# Patient Record
Sex: MALE | Race: WHITE | URBAN - METROPOLITAN AREA
[De-identification: names, ages, dates, MRNs, and addresses within clinical notes are randomized per-mention and may not be internally consistent; named-entity substitution may affect disease eponyms.]

---

## 2020-03-13 ENCOUNTER — OFFICE VISIT (OUTPATIENT)
Dept: INTERNAL MEDICINE CLINIC | Age: 30
End: 2020-03-13

## 2020-03-13 VITALS
BODY MASS INDEX: 28.79 KG/M2 | SYSTOLIC BLOOD PRESSURE: 108 MMHG | OXYGEN SATURATION: 97 % | HEART RATE: 64 BPM | DIASTOLIC BLOOD PRESSURE: 69 MMHG | WEIGHT: 212.6 LBS | HEIGHT: 72 IN | RESPIRATION RATE: 17 BRPM | TEMPERATURE: 98.1 F

## 2020-03-13 DIAGNOSIS — Z13.220 SCREENING FOR LIPID DISORDERS: ICD-10-CM

## 2020-03-13 DIAGNOSIS — Z00.00 WELL ADULT EXAM: Primary | ICD-10-CM

## 2020-03-13 DIAGNOSIS — Z13.1 SCREENING FOR DIABETES MELLITUS: ICD-10-CM

## 2020-03-13 NOTE — PROGRESS NOTES
JULIAN Jones is a 27 y.o. male, he presents today for:    27year old man presents for well visit. Last well visit was likely more than 5 years ago. Medial history significant for kidney stone. No known trigger. Took medication and it passed. Alcohol 1-2 serving 1-2 times per week. 1year old girl. Works as  for for [employer]. Doing this for 4 years. Not exercising very often. Notes he has a long commute. PMH/PSH: reviewed and updated  Sochx:  reports that he has never smoked. He has never used smokeless tobacco. He reports current alcohol use. He reports that he does not use drugs. Famhx: reviewed and updated     All: No Known Allergies  Med: none     Review of Systems   Constitutional: Negative for chills, fever, malaise/fatigue and weight loss. HENT: Negative for congestion. Respiratory: Negative for cough, shortness of breath and wheezing. Cardiovascular: Negative for chest pain and leg swelling. Gastrointestinal: Negative for abdominal pain, diarrhea, nausea and vomiting. Genitourinary: Negative for dysuria. Skin: Negative for rash. Neurological: Negative for dizziness and headaches. PE:  Blood pressure 108/69, pulse 64, temperature 98.1 °F (36.7 °C), temperature source Oral, resp. rate 17, height 6' (1.829 m), weight 212 lb 9.6 oz (96.4 kg), SpO2 97 %. Body mass index is 28.83 kg/m². Physical Exam  Vitals signs and nursing note reviewed. Constitutional:       General: He is not in acute distress. Appearance: Normal appearance. He is well-developed. HENT:      Head: Normocephalic and atraumatic. Nose: Nose normal.      Mouth/Throat:      Mouth: Mucous membranes are moist.   Eyes:      Extraocular Movements: Extraocular movements intact. Conjunctiva/sclera: Conjunctivae normal.      Pupils: Pupils are equal, round, and reactive to light. Neck:      Musculoskeletal: Normal range of motion and neck supple.    Cardiovascular:      Rate and Rhythm: Normal rate and regular rhythm. Pulses: Normal pulses. Heart sounds: Normal heart sounds. Pulmonary:      Effort: Pulmonary effort is normal.      Breath sounds: Normal breath sounds. Musculoskeletal: Normal range of motion. Skin:     General: Skin is warm. Neurological:      General: No focal deficit present. Mental Status: He is alert and oriented to person, place, and time. Labs:   No results found for any visits on 03/13/20. A/P:  27 y.o. male    ICD-10-CM ICD-9-CM    1. Well adult exam Z00.00 V70.0 REFERRAL TO UROLOGY   2. Hyperlipidemia, unspecified hyperlipidemia type E78.5 272.4    3. Screening for diabetes mellitus Z13.1 V77.1 GLUCOSE, RANDOM   4. Screening for lipid disorders Z13.220 V77.91 LIPID PANEL     Well man exam: history and exam revealed issues as noted below. Vaccine status: up to date  Cardiovascular risk: BP well controlled, lipid screen not done recently ordered today as future lab since not fasting. Weight/Diet and Exercise: notes sedentary lifestyle. discussed value of putting focus on improving this. Also discussed that BMi is a little elevated, encouarged patient to consider healthy habits with calories. STD screen: not indicated     - He was given AVS and expressed understanding with the diagnosis and plan as discussed.     Future Appointments   Date Time Provider Janette Biswas   5/21/2020  8:30 AM Harjit Mace MD [id number]

## 2020-03-13 NOTE — PROGRESS NOTES
RM 2    Chief Complaint   Patient presents with   [facility]     new patient      1. Have you been to the ER, urgent care clinic since your last visit? Hospitalized since your last visit? No    2. Have you seen or consulted any other health care providers outside of the [address] [place] [address] since your last visit? Include any pap smears or colon screening. [facility]  Past pcp - 2014 last time he was there     Health Maintenance Due   Topic Date Due    DTaP/Tdap/Td series (2 - Tdap) 01/23/2011       Learning Assessment 3/13/2020   PRIMARY LEARNER Patient   HIGHEST LEVEL OF EDUCATION - PRIMARY LEARNER  4 YEARS OF COLLEGE   BARRIERS PRIMARY LEARNER NONE   CO-LEARNER CAREGIVER No   PRIMARY LANGUAGE ENGLISH    NEED No   LEARNER PREFERENCE PRIMARY VIDEOS   LEARNING SPECIAL TOPICS no   ANSWERED BY patient   RELATIONSHIP SELF       AVS  education, follow up, and recommendations provided and addressed with patient.  services used to advise patient no .

## 2020-03-13 NOTE — PATIENT INSTRUCTIONS
Well Visit, Ages 25 to 48: Care Instructions Your Care Instructions Physical exams can help you stay healthy. Your doctor has checked your overall health and may have suggested ways to take good care of yourself. He or she also may have recommended tests. At home, you can help prevent illness with healthy eating, regular exercise, and other steps. Follow-up care is a key part of your treatment and safety. Be sure to make and go to all appointments, and call your doctor if you are having problems. It's also a good idea to know your test results and keep a list of the medicines you take. How can you care for yourself at home? · Reach and stay at a healthy weight. This will lower your risk for many problems, such as obesity, diabetes, heart disease, and high blood pressure. · Get at least 30 minutes of physical activity on most days of the week. Walking is a good choice. You also may want to do other activities, such as running, swimming, cycling, or playing tennis or team sports. Discuss any changes in your exercise program with your doctor. · Do not smoke or allow others to smoke around you. If you need help quitting, talk to your doctor about stop-smoking programs and medicines. These can increase your chances of quitting for good. · Talk to your doctor about whether you have any risk factors for sexually transmitted infections (STIs). Having one sex partner (who does not have STIs and does not have sex with anyone else) is a good way to avoid these infections. · Use birth control if you do not want to have children at this time. Talk with your doctor about the choices available and what might be best for you. · Protect your skin from too much sun. When you're outdoors from 10 a.m. to 4 p.m., stay in the shade or cover up with clothing and a hat with a wide brim. Wear sunglasses that block UV rays. Even when it's cloudy, put broad-spectrum sunscreen (SPF 30 or higher) on any exposed skin. · See a dentist one or two times a year for checkups and to have your teeth cleaned. · Wear a seat belt in the car. Follow your doctor's advice about when to have certain tests. These tests can spot problems early. For everyone · Cholesterol. Have the fat (cholesterol) in your blood tested after age 21. Your doctor will tell you how often to have this done based on your age, family history, or other things that can increase your risk for heart disease. · Blood pressure. Have your blood pressure checked during a routine doctor visit. Your doctor will tell you how often to check your blood pressure based on your age, your blood pressure results, and other factors. · Vision. Talk with your doctor about how often to have a glaucoma test. 
· Diabetes. Ask your doctor whether you should have tests for diabetes. · Colon cancer. Your risk for colorectal cancer gets higher as you get older. Some experts say that adults should start regular screening at age 48 and stop at age 76. Others say to start before age 48 or continue after age 76. Talk with your doctor about your risk and when to start and stop screening. For women · Breast exam and mammogram. Talk to your doctor about when you should have a clinical breast exam and a mammogram. Medical experts differ on whether and how often women under 50 should have these tests. Your doctor can help you decide what is right for you. · Cervical cancer screening test and pelvic exam. Begin with a Pap test at age 24. The test often is part of a pelvic exam. Starting at age 27, you may choose to have a Pap test, an HPV test, or both tests at the same time (called co-testing). Talk with your doctor about how often to have testing. · Tests for sexually transmitted infections (STIs). Ask whether you should have tests for STIs. You may be at risk if you have sex with more than one person, especially if your partners do not wear condoms. For men · Tests for sexually transmitted infections (STIs). Ask whether you should have tests for STIs. You may be at risk if you have sex with more than one person, especially if you do not wear a condom. · Testicular cancer exam. Ask your doctor whether you should check your testicles regularly. · Prostate exam. Talk to your doctor about whether you should have a blood test (called a PSA test) for prostate cancer. Experts differ on whether and when men should have this test. Some experts suggest it if you are older than 39 and are -American or have a father or brother who got prostate cancer when he was younger than 72. When should you call for help? Watch closely for changes in your health, and be sure to contact your doctor if you have any problems or symptoms that concern you. Where can you learn more? Go to http://gordy-erin.info/ Enter P072 in the search box to learn more about \"Well Visit, Ages 25 to 48: Care Instructions. \" Current as of: August 21, 2019Content Version: 12.4 © 2902-7807 [employer]. Care instructions adapted under license by [employer] (which disclaims liability or warranty for this information). If you have questions about a medical condition or this instruction, always ask your healthcare professional. [address] any warranty or liability for your use of this information.

## 2020-08-18 ENCOUNTER — OFFICE VISIT (OUTPATIENT)
Dept: INTERNAL MEDICINE CLINIC | Age: 30
End: 2020-08-18
Payer: COMMERCIAL

## 2020-08-18 VITALS
WEIGHT: 211 LBS | BODY MASS INDEX: 28.58 KG/M2 | OXYGEN SATURATION: 98 % | SYSTOLIC BLOOD PRESSURE: 134 MMHG | HEIGHT: 72 IN | TEMPERATURE: 97.9 F | RESPIRATION RATE: 17 BRPM | DIASTOLIC BLOOD PRESSURE: 84 MMHG | HEART RATE: 77 BPM

## 2020-08-18 DIAGNOSIS — E78.5 HYPERLIPIDEMIA, UNSPECIFIED HYPERLIPIDEMIA TYPE: ICD-10-CM

## 2020-08-18 DIAGNOSIS — Z00.00 WELL ADULT EXAM: Primary | ICD-10-CM

## 2020-08-18 PROCEDURE — 99395 PREV VISIT EST AGE 18-39: CPT | Performed by: INTERNAL MEDICINE

## 2020-08-18 NOTE — PROGRESS NOTES
JULIAN Ham is a 27 y.o. male, he presents today for:     Family is doing well. Not yet having success with completing     Getting good sleep. Not having stress eating. Not losing appettite. Agrees he is feeling more stress, but handling. PMH/PSH: reviewed and updated  Sochx:  reports that he has never smoked. He has never used smokeless tobacco. He reports current alcohol use. He reports that he does not use drugs. Famhx: reviewed and updated     All: No Known Allergies  Med: none    Review of Systems   Constitutional: Negative for chills, fever and malaise/fatigue. HENT: Negative for congestion. Respiratory: Negative for shortness of breath. Cardiovascular: Negative for chest pain. Gastrointestinal: Negative for abdominal pain and diarrhea. Genitourinary: Negative for dysuria and urgency. Musculoskeletal: Negative. Neurological: Negative for dizziness and headaches. Psychiatric/Behavioral: Negative for depression. The patient is not nervous/anxious and does not have insomnia. PE:  Blood pressure 134/84, pulse 77, temperature 97.9 °F (36.6 °C), temperature source Oral, resp. rate 17, height 6' (1.829 m), weight 211 lb (95.7 kg), SpO2 98 %. Body mass index is 28.62 kg/m². Physical Exam  Vitals signs and nursing note reviewed. Constitutional:       General: He is not in acute distress. Appearance: Normal appearance. He is well-developed and normal weight. HENT:      Head: Normocephalic and atraumatic. Right Ear: Tympanic membrane normal.      Left Ear: Tympanic membrane normal.      Nose: Nose normal. No congestion. Mouth/Throat:      Comments: Wearing mask  Eyes:      Extraocular Movements: Extraocular movements intact. Conjunctiva/sclera: Conjunctivae normal.      Pupils: Pupils are equal, round, and reactive to light. Neck:      Musculoskeletal: Normal range of motion and neck supple.    Cardiovascular:      Rate and Rhythm: Normal rate and regular rhythm. Pulses: Normal pulses. Heart sounds: Normal heart sounds. No murmur. Pulmonary:      Effort: Pulmonary effort is normal.      Breath sounds: Normal breath sounds. Abdominal:      General: Abdomen is flat. There is no distension. Palpations: There is no mass. Musculoskeletal: Normal range of motion. Right lower leg: No edema. Left lower leg: No edema. Lymphadenopathy:      Cervical: No cervical adenopathy. Skin:     General: Skin is warm. Capillary Refill: Capillary refill takes less than 2 seconds. Findings: No rash. Neurological:      General: No focal deficit present. Mental Status: He is alert and oriented to person, place, and time. Psychiatric:         Mood and Affect: Mood normal.         Thought Content: Thought content normal.       Labs:   No results found for any visits on 08/18/20. A/P:  27 y.o. male    ICD-10-CM ICD-9-CM    1. Well adult exam  Z00.00 V70.0    2. Hyperlipidemia, unspecified hyperlipidemia type  E78.5 272.4        Well man exam: history and exam revealed issues as noted below. Vaccine status: up to date  Cardiovascular risk: BP borderline today, will continue to watch. Weight/Diet and Exercise:    - elevated BMI   - sedentary lifestyle: discussed value of regular exercise. STD screen: not indicated     HLD: mixed. Discussed methods to improve cholesterol through diet and lifefstyle choices     - He was given AVS and expressed understanding with the diagnosis and plan as discussed. No future appointments.

## 2020-08-18 NOTE — PATIENT INSTRUCTIONS

## 2020-08-18 NOTE — PROGRESS NOTES
RM 3    Chief Complaint   Patient presents with    Follow-up     labs    Well Male     1. Have you been to the ER, urgent care clinic since your last visit? Hospitalized since your last visit? No    2. Have you seen or consulted any other health care providers outside of the [address] [place] [address] since your last visit? Include any pap smears or colon screening. No    Health Maintenance Due   Topic Date Due    Influenza Age 5 to Adult  08/01/2020       Learning Assessment 3/13/2020   PRIMARY LEARNER Patient   HIGHEST LEVEL OF EDUCATION - PRIMARY LEARNER  4 YEARS OF COLLEGE   BARRIERS PRIMARY LEARNER NONE   CO-LEARNER CAREGIVER No   PRIMARY LANGUAGE ENGLISH    NEED No   LEARNER PREFERENCE PRIMARY VIDEOS   LEARNING SPECIAL TOPICS no   ANSWERED BY patient   RELATIONSHIP SELF           AVS  education, follow up, and recommendations provided and addressed with patient.  services used to advise patient no .

## 2022-03-20 PROBLEM — E78.5 HYPERLIPIDEMIA: Status: ACTIVE | Noted: 2020-08-18

## 2022-03-28 ENCOUNTER — OFFICE VISIT (OUTPATIENT)
Dept: INTERNAL MEDICINE CLINIC | Age: 32
End: 2022-03-28
Payer: COMMERCIAL

## 2022-03-28 VITALS
HEART RATE: 64 BPM | WEIGHT: 212.8 LBS | BODY MASS INDEX: 28.82 KG/M2 | HEIGHT: 72 IN | DIASTOLIC BLOOD PRESSURE: 90 MMHG | RESPIRATION RATE: 18 BRPM | SYSTOLIC BLOOD PRESSURE: 136 MMHG | TEMPERATURE: 97.6 F | OXYGEN SATURATION: 98 %

## 2022-03-28 DIAGNOSIS — Z82.49 FAMILY HISTORY OF HYPERTENSION IN FATHER: ICD-10-CM

## 2022-03-28 DIAGNOSIS — Z00.00 WELL ADULT EXAM: Primary | ICD-10-CM

## 2022-03-28 DIAGNOSIS — M25.521 CHRONIC PAIN OF RIGHT ELBOW: ICD-10-CM

## 2022-03-28 DIAGNOSIS — E78.5 HYPERLIPIDEMIA, UNSPECIFIED HYPERLIPIDEMIA TYPE: ICD-10-CM

## 2022-03-28 DIAGNOSIS — E66.3 OVERWEIGHT: ICD-10-CM

## 2022-03-28 DIAGNOSIS — R03.0 ELEVATED BLOOD PRESSURE READING: ICD-10-CM

## 2022-03-28 DIAGNOSIS — G89.29 CHRONIC PAIN OF RIGHT ELBOW: ICD-10-CM

## 2022-03-28 PROCEDURE — 99395 PREV VISIT EST AGE 18-39: CPT | Performed by: INTERNAL MEDICINE

## 2022-03-28 NOTE — PROGRESS NOTES
RM 1    Chief Complaint   Patient presents with   [facility] Holden Annual Wellness Visit       Visit Vitals  BP (!) 148/89 (BP 1 Location: Left upper arm, BP Patient Position: Sitting, BP Cuff Size: Adult)   Pulse 64   Temp 97.6 °F (36.4 °C) (Oral)   Resp 18   Ht 6' (1.829 m)   Wt 212 lb 12.8 oz (96.5 kg)   SpO2 98%   BMI 28.86 kg/m²       3 most recent PHQ Screens 3/28/2022   Little interest or pleasure in doing things Not at all   Feeling down, depressed, irritable, or hopeless Not at all   Total Score PHQ 2 0         1. Have you been to the ER, urgent care clinic since your last visit? Hospitalized since your last visit? No    2. Have you seen or consulted any other health care providers outside of the [address] [place] [address] since your last visit? Include any pap smears or colon screening. No    Health Maintenance Due   Topic Date Due    Hepatitis C Screening  Never done    COVID-19 Vaccine (1) Never done    Depression Screen  08/18/2021    Flu Vaccine (1) 09/01/2021       Learning Assessment 3/13/2020   PRIMARY LEARNER Patient   HIGHEST LEVEL OF EDUCATION - PRIMARY LEARNER  4 YEARS OF COLLEGE   BARRIERS PRIMARY LEARNER NONE   CO-LEARNER CAREGIVER No   PRIMARY LANGUAGE ENGLISH    NEED No   LEARNER PREFERENCE PRIMARY VIDEOS   LEARNING SPECIAL TOPICS no   ANSWERED BY patient   RELATIONSHIP SELF       AVS  education, follow up, and recommendations provided and addressed with patient.  services used to advise patient. No

## 2022-03-28 NOTE — PATIENT INSTRUCTIONS
Home blood pressure monitor recommended. I have written a prescription - it is possible if you call your insurance and submit this to a preferred medical supply agency this may be a covered benefit, otherwise consider buying out of pocket. One brand I have seen patients bring in that works well is Omron    Ideal blood pressure will average under 120/80. If your home measurements are averaging more than 130/85, I would recommend we consider medication in addition to healthy lifestyle (cardiovascular exercise) to improve control           A Healthy Heart: Care Instructions  Your Care Instructions     Coronary artery disease, also called heart disease, occurs when a substance called plaque builds up in the vessels that supply oxygen-rich blood to your heart muscle. This can narrow the blood vessels and reduce blood flow. A heart attack happens when blood flow is completely blocked. A high-fat diet, smoking, and other factors increase the risk of heart disease. Your doctor has found that you have a chance of having heart disease. You can do lots of things to keep your heart healthy. It may not be easy, but you can change your diet, exercise more, and quit smoking. These steps really work to lower your chance of heart disease. Follow-up care is a key part of your treatment and safety. Be sure to make and go to all appointments, and call your doctor if you are having problems. It's also a good idea to know your test results and keep a list of the medicines you take. How can you care for yourself at home? Diet    · Use less salt when you cook and eat. This helps lower your blood pressure. Taste food before salting. Add only a little salt when you think you need it. With time, your taste buds will adjust to less salt.     · Eat fewer snack items, fast foods, canned soups, and other high-salt, high-fat, processed foods.     · Read food labels and try to avoid saturated and trans fats.  They increase your risk of heart disease by raising cholesterol levels.     · Limit the amount of solid fat-butter, margarine, and shortening-you eat. Use olive, peanut, or canola oil when you cook. Bake, broil, and steam foods instead of frying them.     · Eat a variety of fruit and vegetables every day. Dark green, deep orange, red, or yellow fruits and vegetables are especially good for you. Examples include spinach, carrots, peaches, and berries.     · Foods high in fiber can reduce your cholesterol and provide important vitamins and minerals. High-fiber foods include whole-grain cereals and breads, oatmeal, beans, brown rice, citrus fruits, and apples.     · Eat lean proteins. Heart-healthy proteins include seafood, lean meats and poultry, eggs, beans, peas, nuts, seeds, and soy products.     · Limit drinks and foods with added sugar. These include candy, desserts, and soda pop. Lifestyle changes    · If your doctor recommends it, get more exercise. Walking is a good choice. Bit by bit, increase the amount you walk every day. Try for at least 30 minutes on most days of the week. You also may want to swim, bike, or do other activities.     · Do not smoke. If you need help quitting, talk to your doctor about stop-smoking programs and medicines. These can increase your chances of quitting for good. Quitting smoking may be the most important step you can take to protect your heart. It is never too late to quit.     · Limit alcohol to 2 drinks a day for men and 1 drink a day for women. Too much alcohol can cause health problems.     · Manage other health problems such as diabetes, high blood pressure, and high cholesterol. If you think you may have a problem with alcohol or drug use, talk to your doctor. Medicines    · Take your medicines exactly as prescribed. Call your doctor if you think you are having a problem with your medicine.     · If your doctor recommends aspirin, take the amount directed each day.  Make sure you take aspirin and not another kind of pain reliever, such as acetaminophen (Tylenol). When should you call for help? Call 911 if you have symptoms of a heart attack. These may include:    · Chest pain or pressure, or a strange feeling in the chest.     · Sweating.     · Shortness of breath.     · Pain, pressure, or a strange feeling in the back, neck, jaw, or upper belly or in one or both shoulders or arms.     · Lightheadedness or sudden weakness.     · A fast or irregular heartbeat. After you call 911, the  may tell you to chew 1 adult-strength or 2 to 4 low-dose aspirin. Wait for an ambulance. Do not try to drive yourself. Watch closely for changes in your health, and be sure to contact your doctor if you have any problems. Where can you learn more? Go to http://www.go.com/  Enter F075 in the search box to learn more about \"A Healthy Heart: Care Instructions. \"  Current as of: January 10, 2022               Content Version: 13.2  © 2006-2022 [employer]. Care instructions adapted under license by [employer] (which disclaims liability or warranty for this information). If you have questions about a medical condition or this instruction, always ask your healthcare professional. [address] any warranty or liability for your use of this information.

## 2022-03-28 NOTE — PROGRESS NOTES
A/P:  Paola Campos is a 28 y.o. male, he presents today for:    1. Well adult exam  -     LIPID PANEL; Future  -     METABOLIC PANEL, COMPREHENSIVE; Future  -     REFERRAL TO UROLOGY  2. Hyperlipidemia, unspecified hyperlipidemia type  -     LIPID PANEL; Future  3. Overweight  -     METABOLIC PANEL, COMPREHENSIVE; Future  4. Elevated blood pressure reading  -     METABOLIC PANEL, COMPREHENSIVE; Future  -     AMB SUPPLY ORDER  5. Chronic pain of right elbow  -     REFERRAL TO ORTHOPEDICS  6. Family history of hypertension in father  -     Art Nguyen      Well man exam: history and exam revealed issues as noted below. Vaccine status: up to date  Cardiovascular risk: BP borderline today, will continue to watch. Weight/Diet and Exercise:    - elevated BMI   - sedentary lifestyle: discussed value of regular exercise. ETOH: < 7 per week and no binging.      HLD: mixed. Discussed methods to improve cholesterol through diet and lifefstyle choices     Pre-hypertension - start home monitoring, discussed increased cardiovascular exercise - follow-up in 2-3 months with home measurements    HPI    28year old man,  with 3child - 11years old. Works for [employer] as an . No chronic medical disease. 3 most recent PHQ Screens 3/28/2022   Little interest or pleasure in doing things Not at all   Feeling down, depressed, irritable, or hopeless Not at all   Total Score PHQ 2 0     Recurrent lateral elbow pain- feels like it is getting stuck and popping feeling with certain motions. Started ~ 2 years ago after focused time buiding a small brick wall. - notices some dysfunction every day with small level of pain. No prior history of trauma nor disolcation. No other joints that are bothering.      PMH/PSH: reviewed and updated  Sochx/Famhx: reviewed and updated     All: No Known Allergies  Med: none    ROS pertinent for the following:  ROS    PE:  Blood pressure (!) 144/87, pulse 64, temperature 97.6 °F (36.4 °C), temperature source Oral, resp. rate 18, height 6' (1.829 m), weight 212 lb 12.8 oz (96.5 kg), SpO2 98 %. Body mass index is 28.86 kg/m². Physical Exam  Vitals and nursing note reviewed. Constitutional:       General: He is not in acute distress. Appearance: Normal appearance. He is well-developed. HENT:      Head: Normocephalic and atraumatic. Right Ear: Tympanic membrane normal.      Left Ear: Tympanic membrane normal.   Eyes:      Extraocular Movements: Extraocular movements intact. Conjunctiva/sclera: Conjunctivae normal.      Pupils: Pupils are equal, round, and reactive to light. Cardiovascular:      Rate and Rhythm: Normal rate and regular rhythm. Pulses: Normal pulses. Heart sounds: Normal heart sounds. Pulmonary:      Effort: Pulmonary effort is normal.      Breath sounds: Normal breath sounds. Abdominal:      General: There is no distension. Palpations: Abdomen is soft. There is no mass. Musculoskeletal:         General: Normal range of motion. Cervical back: Normal range of motion and neck supple. Right lower leg: No edema. Left lower leg: No edema. Comments: Right elbow  No swelling nor popping felt on exam. Area of discomfort locating to head of radius. Skin:     General: Skin is warm. Neurological:      General: No focal deficit present. Mental Status: He is alert and oriented to person, place, and time. Psychiatric:         Mood and Affect: Mood normal.         Behavior: Behavior normal.       Labs:   See addendum for interpretation of labs resulting after time of visit. He was given AVS and expressed understanding with the diagnosis and plan as discussed. An electronic signature was used to authenticate this note.   -- Gutierrez Valera MD

## 2022-04-26 NOTE — PROGRESS NOTES
HPI: Jeff Marie (: 1990) is a 28 y.o. male, patient, here for evaluation of the following chief complaint(s):    Elbow Pain (early pandimic was picking up bricks and some how did something to the right elbow )  Patient is seen today to evaluate his right arm. The patient was picking up bricks and developed right elbow pain just prior to the pandemic starting around 2020. He has been able to self treat throughout the pandemic but has complained of ongoing pain. The pain is more localized posterior than medial or lateral.  He admittedly describes the pain as being more soreness than actual ongoing pain. He really has denied any numbness tingling or paresthesias. He has been able to carry out his activities. He denies any left elbow pain and is seen for further treatment. Vitals:  Ht 6' (1.829 m)   Wt 210 lb (95.3 kg)   BMI 28.48 kg/m²    Body mass index is 28.48 kg/m². No Known Allergies    Current Outpatient Medications   Medication Sig    methylPREDNISolone (MEDROL DOSEPACK) 4 mg tablet Per dose pack instructions    diclofenac sodium (Pennsaid) 2 % sopk 2 Pump(s) by Apply Externally route two (2) times daily as needed for Pain. No current facility-administered medications for this visit. Past Medical History:   Diagnosis Date    Kidney stone 2019        History reviewed. No pertinent surgical history. Family History   Problem Relation Age of Onset    Hypertension Father     Cancer Paternal Grandfather         bladder        Social History     Tobacco Use    Smoking status: Never Smoker    Smokeless tobacco: Never Used   Substance Use Topics    Alcohol use: Yes     Comment: occassionally     Drug use: Never        Review of Systems   All other systems reviewed and are negative. Physical Exam    Both elbows essentially demonstrate full range of motion. There is some discomfort more to the posterior aspect of the right elbow but no real bursitis.   No palpable spur. No cyst or mass. He really was nontender the medial and lateral condyles. Pain was not worsened during elbow extension and resisted wrist extension testing nor specifically with resisted forearm pronation testing. He otherwise remains distal neurovascularly intact and had no forearm wrist or hand complaints bilaterally. Imaging:    XR Results (most recent):  Results from Appointment encounter on 04/27/22    XR ELBOW RT AP/LAT    Narrative  AP and lateral x-ray of the right elbow show normal osseous and joint space findings no evidence of fracture or arthritis. ASSESSMENT/PLAN:  Below is the assessment and plan developed based on review of pertinent history, physical exam, labs, studies, and medications. Patient examination was consistent with right elbow posterior pain perhaps triceps tendinitis but not really clear for triceps or only humeral impingement. There may be a lesser component truly of lateral condyle-itis. He does plan to trial a flex bar and continue to follow home physiotherapy exercises for LAT epicondylitis. We decided to hold off on an injection. He can trial a Medrol Dosepak and a topical anti-inflammatory. He deferred the need for formal outpatient therapy and will hold off on further diagnostic testing though I did mention to him that an MRI may be helpful now that he has had pain for 2 years. I plan to see him back in 4 to 6 weeks to check his progress and sooner if needed. 1. Lateral epicondylitis of right elbow  -     methylPREDNISolone (MEDROL DOSEPACK) 4 mg tablet; Per dose pack instructions, Normal, Disp-1 Dose Pack, R-0  -     diclofenac sodium (Pennsaid) 2 % sopk; 2 Pump(s) by Apply Externally route two (2) times daily as needed for Pain., Normal, Disp-1 Packet, R-1  2. Right elbow pain  -     XR ELBOW RT AP/LAT; Future      No follow-ups on file. An electronic signature was used to authenticate this note.   -- Stacie Rust MD

## 2022-04-27 ENCOUNTER — OFFICE VISIT (OUTPATIENT)
Dept: ORTHOPEDIC SURGERY | Age: 32
End: 2022-04-27
Payer: COMMERCIAL

## 2022-04-27 VITALS — BODY MASS INDEX: 28.44 KG/M2 | HEIGHT: 72 IN | WEIGHT: 210 LBS

## 2022-04-27 DIAGNOSIS — M25.521 RIGHT ELBOW PAIN: ICD-10-CM

## 2022-04-27 DIAGNOSIS — M77.11 LATERAL EPICONDYLITIS OF RIGHT ELBOW: Primary | ICD-10-CM

## 2022-04-27 PROCEDURE — 99203 OFFICE O/P NEW LOW 30 MIN: CPT | Performed by: ORTHOPAEDIC SURGERY

## 2022-04-27 RX ORDER — METHYLPREDNISOLONE 4 MG/1
TABLET ORAL
Qty: 1 DOSE PACK | Refills: 0 | Status: SHIPPED | OUTPATIENT
Start: 2022-04-27

## 2022-04-27 RX ORDER — DICLOFENAC SODIUM 20 MG/G
2 SOLUTION TOPICAL
Qty: 1 PACKET | Refills: 1 | Status: SHIPPED | OUTPATIENT
Start: 2022-04-27

## 2022-04-27 NOTE — LETTER
4/27/2022    Patient: Lourdes Pulido   YOB: 1990   Date of Visit: 4/27/2022     Elliot Yoder MD  [place] [address]  [facility] [address]    Dear Elliot Yoder MD,      Thank you for referring Mr. Hemant Hallman to [facility] for evaluation. My notes for this consultation are attached. If you have questions, please do not hesitate to call me. I look forward to following your patient along with you.       Sincerely,    Essence Ramírez MD

## 2023-05-17 RX ORDER — METHYLPREDNISOLONE 4 MG/1
TABLET ORAL
COMMUNITY
Start: 2022-04-27

## 2023-05-17 RX ORDER — DICLOFENAC SODIUM 20 MG/G
2 SOLUTION TOPICAL 2 TIMES DAILY PRN
COMMUNITY
Start: 2022-04-27

## 2023-12-04 SDOH — HEALTH STABILITY: PHYSICAL HEALTH: ON AVERAGE, HOW MANY DAYS PER WEEK DO YOU ENGAGE IN MODERATE TO STRENUOUS EXERCISE (LIKE A BRISK WALK)?: 1 DAY

## 2023-12-04 SDOH — HEALTH STABILITY: PHYSICAL HEALTH: ON AVERAGE, HOW MANY MINUTES DO YOU ENGAGE IN EXERCISE AT THIS LEVEL?: 60 MIN

## 2023-12-05 ENCOUNTER — OFFICE VISIT (OUTPATIENT)
Age: 33
End: 2023-12-05
Payer: COMMERCIAL

## 2023-12-05 VITALS
HEART RATE: 62 BPM | WEIGHT: 221.6 LBS | HEIGHT: 72 IN | RESPIRATION RATE: 16 BRPM | DIASTOLIC BLOOD PRESSURE: 89 MMHG | SYSTOLIC BLOOD PRESSURE: 136 MMHG | BODY MASS INDEX: 30.02 KG/M2 | OXYGEN SATURATION: 98 % | TEMPERATURE: 98.9 F

## 2023-12-05 DIAGNOSIS — Z00.00 VISIT FOR WELL MAN HEALTH CHECK: ICD-10-CM

## 2023-12-05 DIAGNOSIS — E66.09 CLASS 1 OBESITY DUE TO EXCESS CALORIES WITH SERIOUS COMORBIDITY AND BODY MASS INDEX (BMI) OF 30.0 TO 30.9 IN ADULT: ICD-10-CM

## 2023-12-05 DIAGNOSIS — I10 PRIMARY HYPERTENSION: Primary | ICD-10-CM

## 2023-12-05 DIAGNOSIS — Z76.89 ESTABLISHING CARE WITH NEW DOCTOR, ENCOUNTER FOR: ICD-10-CM

## 2023-12-05 DIAGNOSIS — E78.00 ELEVATED CHOLESTEROL: ICD-10-CM

## 2023-12-05 DIAGNOSIS — Z11.4 SCREENING FOR HIV (HUMAN IMMUNODEFICIENCY VIRUS): ICD-10-CM

## 2023-12-05 DIAGNOSIS — Z11.59 ENCOUNTER FOR HEPATITIS C SCREENING TEST FOR LOW RISK PATIENT: ICD-10-CM

## 2023-12-05 DIAGNOSIS — R06.83 SNORING: ICD-10-CM

## 2023-12-05 PROCEDURE — G8482 FLU IMMUNIZE ORDER/ADMIN: HCPCS | Performed by: STUDENT IN AN ORGANIZED HEALTH CARE EDUCATION/TRAINING PROGRAM

## 2023-12-05 PROCEDURE — 1036F TOBACCO NON-USER: CPT | Performed by: STUDENT IN AN ORGANIZED HEALTH CARE EDUCATION/TRAINING PROGRAM

## 2023-12-05 PROCEDURE — G8417 CALC BMI ABV UP PARAM F/U: HCPCS | Performed by: STUDENT IN AN ORGANIZED HEALTH CARE EDUCATION/TRAINING PROGRAM

## 2023-12-05 PROCEDURE — 99395 PREV VISIT EST AGE 18-39: CPT | Performed by: STUDENT IN AN ORGANIZED HEALTH CARE EDUCATION/TRAINING PROGRAM

## 2023-12-05 PROCEDURE — 3074F SYST BP LT 130 MM HG: CPT | Performed by: STUDENT IN AN ORGANIZED HEALTH CARE EDUCATION/TRAINING PROGRAM

## 2023-12-05 PROCEDURE — G8427 DOCREV CUR MEDS BY ELIG CLIN: HCPCS | Performed by: STUDENT IN AN ORGANIZED HEALTH CARE EDUCATION/TRAINING PROGRAM

## 2023-12-05 PROCEDURE — 99214 OFFICE O/P EST MOD 30 MIN: CPT | Performed by: STUDENT IN AN ORGANIZED HEALTH CARE EDUCATION/TRAINING PROGRAM

## 2023-12-05 PROCEDURE — 3079F DIAST BP 80-89 MM HG: CPT | Performed by: STUDENT IN AN ORGANIZED HEALTH CARE EDUCATION/TRAINING PROGRAM

## 2023-12-05 RX ORDER — CHLORTHALIDONE 25 MG/1
12.5 TABLET ORAL DAILY
Qty: 30 TABLET | Refills: 2 | Status: SHIPPED | OUTPATIENT
Start: 2023-12-05

## 2023-12-05 ASSESSMENT — ENCOUNTER SYMPTOMS
SHORTNESS OF BREATH: 0
DIARRHEA: 0
VOMITING: 0
CONSTIPATION: 0
BLOOD IN STOOL: 0
NAUSEA: 0
ABDOMINAL PAIN: 0
COUGH: 0

## 2023-12-05 NOTE — PROGRESS NOTES
Korin Guerra (:  1990) is a 35 y.o. male, New patient, here for evaluation of the following chief complaint(s):  New Patient (Sleep apnea concern/ high blood pressure concerns)    Established to practice. New patient to this provider. Subjective   SUBJECTIVE/OBJECTIVE:  Patient presents today to establish care with me    Patient does have prior PCP  Patient's prior PCP is Arnaldo Rice MD    Acute concerns:   Concern for higher BP  Concern for sleep apnea   - I had patient do Vandervoort Sleepiness Scale, but patient only scored 1  - STOP-BANG was 5 (+1 for snoring, tired, blood pressure, neck circumference of 45 cm and male gender)    Chronic problems:  Elevated blood pressure - has had multiple readings in the past  Does check at home, runs 140/90  Not on any medications    Patient is here today for annual wellness exam    EtOH/Tobacco/Drugs  - Tobacco: No  - EtOH: Yes - 2 drinks/week  - Other: no  - Denies other drug use  - Feels safe at home    Preventative care  - BP screen: 136/89 - new dx of HTN  - Lipid panel:  with .8, total 221. .  - Weight/BMI: 100.5 kg. BMI 30.05  - Diabetes: paternal grandfather has this, would like to have this checked  - Lung cancer: not indicated  - Prostate cancer: not indicated, patient asymptomatic  - Colorectal cancer: not indicated at this time, recommendation is to begin at age 39  - HIV: Discussed once in lifetime screen. Patient response: Yes  - Hepatitis C: Discussed once in lifetime screen.  Patient response: Yes  - STDs: No concerns for STDs, screening refused  - Vaccinations: Has obtained flu and COVID already  - Social determinants of health: Reviewed without concerns  - Depression screen: Reviewed without concerns        Preventative care:  Health Maintenance Due   Topic Date Due    Hepatitis B vaccine (1 of 3 - 3-dose series) Never done    Varicella vaccine (1 of 2 - 2-dose childhood series) Never done    HIV screen  Never done

## 2023-12-06 ENCOUNTER — TELEPHONE (OUTPATIENT)
Age: 33
End: 2023-12-06

## 2023-12-22 DIAGNOSIS — E78.00 ELEVATED CHOLESTEROL: ICD-10-CM

## 2023-12-22 DIAGNOSIS — Z11.4 SCREENING FOR HIV (HUMAN IMMUNODEFICIENCY VIRUS): ICD-10-CM

## 2023-12-22 DIAGNOSIS — Z11.59 ENCOUNTER FOR HEPATITIS C SCREENING TEST FOR LOW RISK PATIENT: ICD-10-CM

## 2023-12-22 DIAGNOSIS — Z00.00 VISIT FOR WELL MAN HEALTH CHECK: ICD-10-CM

## 2023-12-22 DIAGNOSIS — I10 PRIMARY HYPERTENSION: ICD-10-CM

## 2023-12-22 LAB
ALBUMIN SERPL-MCNC: 4.5 G/DL (ref 3.5–5)
ALBUMIN/GLOB SERPL: 1.1 (ref 1.1–2.2)
ALP SERPL-CCNC: 78 U/L (ref 45–117)
ALT SERPL-CCNC: 44 U/L (ref 12–78)
ANION GAP SERPL CALC-SCNC: 7 MMOL/L (ref 5–15)
AST SERPL-CCNC: 20 U/L (ref 15–37)
BASOPHILS # BLD: 0.1 K/UL (ref 0–0.1)
BASOPHILS NFR BLD: 1 % (ref 0–1)
BILIRUB SERPL-MCNC: 0.7 MG/DL (ref 0.2–1)
BUN SERPL-MCNC: 17 MG/DL (ref 6–20)
BUN/CREAT SERPL: 15 (ref 12–20)
CALCIUM SERPL-MCNC: 9.8 MG/DL (ref 8.5–10.1)
CHLORIDE SERPL-SCNC: 101 MMOL/L (ref 97–108)
CHOLEST SERPL-MCNC: 277 MG/DL
CO2 SERPL-SCNC: 31 MMOL/L (ref 21–32)
CREAT SERPL-MCNC: 1.14 MG/DL (ref 0.7–1.3)
DIFFERENTIAL METHOD BLD: ABNORMAL
EOSINOPHIL # BLD: 0.1 K/UL (ref 0–0.4)
EOSINOPHIL NFR BLD: 1 % (ref 0–7)
ERYTHROCYTE [DISTWIDTH] IN BLOOD BY AUTOMATED COUNT: 12.2 % (ref 11.5–14.5)
EST. AVERAGE GLUCOSE BLD GHB EST-MCNC: 97 MG/DL
GLOBULIN SER CALC-MCNC: 4 G/DL (ref 2–4)
GLUCOSE SERPL-MCNC: 93 MG/DL (ref 65–100)
HBA1C MFR BLD: 5 % (ref 4–5.6)
HCT VFR BLD AUTO: 49.3 % (ref 36.6–50.3)
HDLC SERPL-MCNC: 43 MG/DL
HDLC SERPL: 6.4 (ref 0–5)
HGB BLD-MCNC: 17.4 G/DL (ref 12.1–17)
HIV 1+2 AB+HIV1 P24 AG SERPL QL IA: NONREACTIVE
HIV 1/2 RESULT COMMENT: NORMAL
IMM GRANULOCYTES # BLD AUTO: 0 K/UL (ref 0–0.04)
IMM GRANULOCYTES NFR BLD AUTO: 0 % (ref 0–0.5)
LDLC SERPL CALC-MCNC: 158 MG/DL (ref 0–100)
LYMPHOCYTES # BLD: 2.3 K/UL (ref 0.8–3.5)
LYMPHOCYTES NFR BLD: 37 % (ref 12–49)
MCH RBC QN AUTO: 30.1 PG (ref 26–34)
MCHC RBC AUTO-ENTMCNC: 35.3 G/DL (ref 30–36.5)
MCV RBC AUTO: 85.1 FL (ref 80–99)
MONOCYTES # BLD: 0.5 K/UL (ref 0–1)
MONOCYTES NFR BLD: 9 % (ref 5–13)
NEUTS SEG # BLD: 3.2 K/UL (ref 1.8–8)
NEUTS SEG NFR BLD: 52 % (ref 32–75)
NRBC # BLD: 0 K/UL (ref 0–0.01)
NRBC BLD-RTO: 0 PER 100 WBC
PLATELET # BLD AUTO: 207 K/UL (ref 150–400)
PMV BLD AUTO: 11 FL (ref 8.9–12.9)
POTASSIUM SERPL-SCNC: 3.9 MMOL/L (ref 3.5–5.1)
PROT SERPL-MCNC: 8.5 G/DL (ref 6.4–8.2)
RBC # BLD AUTO: 5.79 M/UL (ref 4.1–5.7)
SODIUM SERPL-SCNC: 139 MMOL/L (ref 136–145)
TRIGL SERPL-MCNC: 380 MG/DL
TSH SERPL DL<=0.05 MIU/L-ACNC: 1.45 UIU/ML (ref 0.36–3.74)
VLDLC SERPL CALC-MCNC: 76 MG/DL
WBC # BLD AUTO: 6.2 K/UL (ref 4.1–11.1)

## 2023-12-23 LAB
HCV AB SERPL QL IA: NORMAL
HCV IGG SERPL QL IA: NON REACTIVE S/CO RATIO

## 2024-01-02 PROBLEM — R06.83 SNORING: Status: ACTIVE | Noted: 2024-01-02

## 2024-01-02 PROBLEM — I10 PRIMARY HYPERTENSION: Status: ACTIVE | Noted: 2024-01-02

## 2024-01-02 PROBLEM — E66.811 CLASS 1 OBESITY DUE TO EXCESS CALORIES WITH SERIOUS COMORBIDITY AND BODY MASS INDEX (BMI) OF 30.0 TO 30.9 IN ADULT: Status: ACTIVE | Noted: 2024-01-02

## 2024-01-02 PROBLEM — E78.00 ELEVATED CHOLESTEROL: Status: ACTIVE | Noted: 2020-08-18

## 2024-01-02 PROBLEM — E66.09 CLASS 1 OBESITY DUE TO EXCESS CALORIES WITH SERIOUS COMORBIDITY AND BODY MASS INDEX (BMI) OF 30.0 TO 30.9 IN ADULT: Status: ACTIVE | Noted: 2024-01-02

## 2024-01-02 ASSESSMENT — ENCOUNTER SYMPTOMS
DIARRHEA: 0
NAUSEA: 0
CONSTIPATION: 0
BLOOD IN STOOL: 0
ABDOMINAL PAIN: 0
COUGH: 0
SHORTNESS OF BREATH: 0
VOMITING: 0

## 2024-01-02 NOTE — PROGRESS NOTES
Emre Meadows (:  1990) is a 33 y.o. male, Established patient, here for evaluation of the following chief complaint(s):  Follow-up (Primary hypertension +7 more)        Subjective   SUBJECTIVE/OBJECTIVE:  Patient presents today for follow-up for the following:    Hypertension  - Home BP readings:  - 145/86,  - 149/83, medicine started 23  - BP today 134/91  - Medications: Chlorthalidone 25 mg daily      - Compliance: yes, no side effects, missed a few tablets here and there  - Reviewed over Diet and exercise:   - Recommended low salt diet, DASH diet  - Recommended 150 mins mod intensity weekly    Hyperlipidemia:  - Last lipid panel: 23. Total: 277, T, LDL: 158, HDL: 43  - 10 year ASCVD Risk: unable to calculate due to age <40  - Statin therapy: no    Obesity:  - Reviewed over weight and BMI, weight is 100.5 kg, BMI 30.05 previously  - Weight today: 100.6 kg and BMI 30.08 today    Snoring - referral was placed to sleep medicine for sleep study previously - has appointment 2024      Preventative care:  Health Maintenance Due   Topic Date Due    Hepatitis B vaccine (1 of 3 - 3-dose series) Never done    Varicella vaccine (1 of 2 - 2-dose childhood series) Never done          Past Medical History:   Diagnosis Date    Kidney stone 2019     History reviewed. No pertinent surgical history.   Family History   Problem Relation Age of Onset    Sleep Apnea Mother     Hypertension Father     Cancer Paternal Grandfather         bladder     Social History     Socioeconomic History    Marital status:      Spouse name: Not on file    Number of children: Not on file    Years of education: Not on file    Highest education level: Not on file   Occupational History    Not on file   Tobacco Use    Smoking status: Never    Smokeless tobacco: Never   Vaping Use    Vaping Use: Never used   Substance and Sexual Activity    Alcohol use: Yes    Drug use: Never    Sexual activity: Not on file

## 2024-01-03 ENCOUNTER — OFFICE VISIT (OUTPATIENT)
Age: 34
End: 2024-01-03
Payer: COMMERCIAL

## 2024-01-03 VITALS
HEIGHT: 72 IN | DIASTOLIC BLOOD PRESSURE: 91 MMHG | SYSTOLIC BLOOD PRESSURE: 134 MMHG | RESPIRATION RATE: 16 BRPM | OXYGEN SATURATION: 98 % | HEART RATE: 70 BPM | BODY MASS INDEX: 30.04 KG/M2 | TEMPERATURE: 97.3 F | WEIGHT: 221.8 LBS

## 2024-01-03 DIAGNOSIS — E66.09 CLASS 1 OBESITY DUE TO EXCESS CALORIES WITH SERIOUS COMORBIDITY AND BODY MASS INDEX (BMI) OF 30.0 TO 30.9 IN ADULT: ICD-10-CM

## 2024-01-03 DIAGNOSIS — I10 PRIMARY HYPERTENSION: Primary | ICD-10-CM

## 2024-01-03 DIAGNOSIS — R06.83 SNORING: ICD-10-CM

## 2024-01-03 DIAGNOSIS — E78.00 ELEVATED CHOLESTEROL: ICD-10-CM

## 2024-01-03 PROCEDURE — G8417 CALC BMI ABV UP PARAM F/U: HCPCS | Performed by: STUDENT IN AN ORGANIZED HEALTH CARE EDUCATION/TRAINING PROGRAM

## 2024-01-03 PROCEDURE — G8482 FLU IMMUNIZE ORDER/ADMIN: HCPCS | Performed by: STUDENT IN AN ORGANIZED HEALTH CARE EDUCATION/TRAINING PROGRAM

## 2024-01-03 PROCEDURE — 3075F SYST BP GE 130 - 139MM HG: CPT | Performed by: STUDENT IN AN ORGANIZED HEALTH CARE EDUCATION/TRAINING PROGRAM

## 2024-01-03 PROCEDURE — 1036F TOBACCO NON-USER: CPT | Performed by: STUDENT IN AN ORGANIZED HEALTH CARE EDUCATION/TRAINING PROGRAM

## 2024-01-03 PROCEDURE — 3079F DIAST BP 80-89 MM HG: CPT | Performed by: STUDENT IN AN ORGANIZED HEALTH CARE EDUCATION/TRAINING PROGRAM

## 2024-01-03 PROCEDURE — 99214 OFFICE O/P EST MOD 30 MIN: CPT | Performed by: STUDENT IN AN ORGANIZED HEALTH CARE EDUCATION/TRAINING PROGRAM

## 2024-01-03 PROCEDURE — G8427 DOCREV CUR MEDS BY ELIG CLIN: HCPCS | Performed by: STUDENT IN AN ORGANIZED HEALTH CARE EDUCATION/TRAINING PROGRAM

## 2024-01-03 RX ORDER — CHLORTHALIDONE 25 MG/1
25 TABLET ORAL DAILY
Qty: 30 TABLET | Refills: 2 | Status: SHIPPED | OUTPATIENT
Start: 2024-01-03

## 2024-01-03 RX ORDER — AMLODIPINE BESYLATE 5 MG/1
5 TABLET ORAL EVERY EVENING
Qty: 30 TABLET | Refills: 2 | Status: SHIPPED | OUTPATIENT
Start: 2024-01-09

## 2024-01-03 SDOH — ECONOMIC STABILITY: FOOD INSECURITY: WITHIN THE PAST 12 MONTHS, THE FOOD YOU BOUGHT JUST DIDN'T LAST AND YOU DIDN'T HAVE MONEY TO GET MORE.: NEVER TRUE

## 2024-01-03 SDOH — ECONOMIC STABILITY: TRANSPORTATION INSECURITY
IN THE PAST 12 MONTHS, HAS LACK OF TRANSPORTATION KEPT YOU FROM MEETINGS, WORK, OR FROM GETTING THINGS NEEDED FOR DAILY LIVING?: NO

## 2024-01-03 SDOH — ECONOMIC STABILITY: HOUSING INSECURITY
IN THE LAST 12 MONTHS, WAS THERE A TIME WHEN YOU DID NOT HAVE A STEADY PLACE TO SLEEP OR SLEPT IN A SHELTER (INCLUDING NOW)?: NO

## 2024-01-03 SDOH — ECONOMIC STABILITY: INCOME INSECURITY: HOW HARD IS IT FOR YOU TO PAY FOR THE VERY BASICS LIKE FOOD, HOUSING, MEDICAL CARE, AND HEATING?: NOT HARD AT ALL

## 2024-01-03 SDOH — ECONOMIC STABILITY: FOOD INSECURITY: WITHIN THE PAST 12 MONTHS, YOU WORRIED THAT YOUR FOOD WOULD RUN OUT BEFORE YOU GOT MONEY TO BUY MORE.: NEVER TRUE

## 2024-01-03 ASSESSMENT — PATIENT HEALTH QUESTIONNAIRE - PHQ9
SUM OF ALL RESPONSES TO PHQ QUESTIONS 1-9: 0
2. FEELING DOWN, DEPRESSED OR HOPELESS: 0
SUM OF ALL RESPONSES TO PHQ QUESTIONS 1-9: 0
1. LITTLE INTEREST OR PLEASURE IN DOING THINGS: 0
SUM OF ALL RESPONSES TO PHQ9 QUESTIONS 1 & 2: 0

## 2024-01-03 NOTE — PROGRESS NOTES
Emre Meadows is a 33 y.o. male    Chief Complaint   Patient presents with    Follow-up     Primary hypertension +7 more       BP (!) 134/91   Pulse 70   Temp 97.3 °F (36.3 °C)   Resp 16   Ht 1.829 m (6')   Wt 100.6 kg (221 lb 12.8 oz)   SpO2 98%   BMI 30.08 kg/m²         1. Have you been to the ER, urgent care clinic since your last visit?  Hospitalized since your last visit? No    2. Have you seen or consulted any other health care providers outside of the [facility] since your last visit?  Include any pap smears or colon screening. No

## 2024-02-01 ENCOUNTER — OFFICE VISIT (OUTPATIENT)
Age: 34
End: 2024-02-01
Payer: COMMERCIAL

## 2024-02-01 VITALS
OXYGEN SATURATION: 98 % | BODY MASS INDEX: 29.34 KG/M2 | HEIGHT: 72 IN | WEIGHT: 216.6 LBS | HEART RATE: 68 BPM | TEMPERATURE: 98.2 F | DIASTOLIC BLOOD PRESSURE: 80 MMHG | SYSTOLIC BLOOD PRESSURE: 120 MMHG

## 2024-02-01 DIAGNOSIS — E66.3 OVERWEIGHT (BMI 25.0-29.9): ICD-10-CM

## 2024-02-01 DIAGNOSIS — I10 PRIMARY HYPERTENSION: Primary | ICD-10-CM

## 2024-02-01 DIAGNOSIS — R06.83 SNORING: ICD-10-CM

## 2024-02-01 DIAGNOSIS — E78.00 ELEVATED CHOLESTEROL: ICD-10-CM

## 2024-02-01 PROBLEM — E66.09 CLASS 1 OBESITY DUE TO EXCESS CALORIES WITH SERIOUS COMORBIDITY AND BODY MASS INDEX (BMI) OF 30.0 TO 30.9 IN ADULT: Status: RESOLVED | Noted: 2024-01-02 | Resolved: 2024-02-01

## 2024-02-01 PROBLEM — E66.811 CLASS 1 OBESITY DUE TO EXCESS CALORIES WITH SERIOUS COMORBIDITY AND BODY MASS INDEX (BMI) OF 30.0 TO 30.9 IN ADULT: Status: RESOLVED | Noted: 2024-01-02 | Resolved: 2024-02-01

## 2024-02-01 PROCEDURE — G8417 CALC BMI ABV UP PARAM F/U: HCPCS | Performed by: STUDENT IN AN ORGANIZED HEALTH CARE EDUCATION/TRAINING PROGRAM

## 2024-02-01 PROCEDURE — 3079F DIAST BP 80-89 MM HG: CPT | Performed by: STUDENT IN AN ORGANIZED HEALTH CARE EDUCATION/TRAINING PROGRAM

## 2024-02-01 PROCEDURE — G8482 FLU IMMUNIZE ORDER/ADMIN: HCPCS | Performed by: STUDENT IN AN ORGANIZED HEALTH CARE EDUCATION/TRAINING PROGRAM

## 2024-02-01 PROCEDURE — 99214 OFFICE O/P EST MOD 30 MIN: CPT | Performed by: STUDENT IN AN ORGANIZED HEALTH CARE EDUCATION/TRAINING PROGRAM

## 2024-02-01 PROCEDURE — G8427 DOCREV CUR MEDS BY ELIG CLIN: HCPCS | Performed by: STUDENT IN AN ORGANIZED HEALTH CARE EDUCATION/TRAINING PROGRAM

## 2024-02-01 PROCEDURE — 1036F TOBACCO NON-USER: CPT | Performed by: STUDENT IN AN ORGANIZED HEALTH CARE EDUCATION/TRAINING PROGRAM

## 2024-02-01 PROCEDURE — 3074F SYST BP LT 130 MM HG: CPT | Performed by: STUDENT IN AN ORGANIZED HEALTH CARE EDUCATION/TRAINING PROGRAM

## 2024-02-01 RX ORDER — CHLORTHALIDONE 25 MG/1
25 TABLET ORAL DAILY
Qty: 90 TABLET | Refills: 3 | Status: SHIPPED | OUTPATIENT
Start: 2024-02-01

## 2024-02-01 ASSESSMENT — PATIENT HEALTH QUESTIONNAIRE - PHQ9
SUM OF ALL RESPONSES TO PHQ QUESTIONS 1-9: 0
SUM OF ALL RESPONSES TO PHQ QUESTIONS 1-9: 0
1. LITTLE INTEREST OR PLEASURE IN DOING THINGS: 0
2. FEELING DOWN, DEPRESSED OR HOPELESS: 0
SUM OF ALL RESPONSES TO PHQ QUESTIONS 1-9: 0
SUM OF ALL RESPONSES TO PHQ QUESTIONS 1-9: 0
SUM OF ALL RESPONSES TO PHQ9 QUESTIONS 1 & 2: 0

## 2024-02-01 ASSESSMENT — ENCOUNTER SYMPTOMS
SHORTNESS OF BREATH: 0
COUGH: 0
BLOOD IN STOOL: 0
ABDOMINAL PAIN: 0
VOMITING: 0
CONSTIPATION: 0
NAUSEA: 0
DIARRHEA: 0

## 2024-02-01 NOTE — PROGRESS NOTES
RM13    Chief Complaint   Patient presents with    Follow-up     Pt denied any questions or concern today.       Vitals:    02/01/24 0835   BP: 120/80   Site: Left Upper Arm   Position: Sitting   Pulse: 68   Temp: 98.2 °F (36.8 °C)   TempSrc: Oral   SpO2: 98%   Weight: 98.2 kg (216 lb 9.6 oz)   Height: 1.829 m (6')       \"Have you been to the ER, urgent care clinic since your last visit?  Hospitalized since your last visit?\"    NO    “Have you seen or consulted any other health care providers outside of [facility] since your last visit?”    NO    Health Maintenance Due   Topic Date Due    Hepatitis B vaccine (1 of 3 - 3-dose series) Never done    Varicella vaccine (1 of 2 - 2-dose childhood series) Never done       AVS  education, follow up, and recommendations provided and addressed with patient.  services used to advise patient No  
medicine - has appointment 2/26/24    Return in about 5 months (around 6/25/2024) for Hypertension.         An electronic signature was used to authenticate this note.    --Jayme Gomez MD

## 2024-04-04 ENCOUNTER — TELEMEDICINE (OUTPATIENT)
Age: 34
End: 2024-04-04
Payer: COMMERCIAL

## 2024-04-04 DIAGNOSIS — I10 PRIMARY HYPERTENSION: ICD-10-CM

## 2024-04-04 DIAGNOSIS — G47.33 OBSTRUCTIVE SLEEP APNEA (ADULT) (PEDIATRIC): Primary | ICD-10-CM

## 2024-04-04 PROCEDURE — G8427 DOCREV CUR MEDS BY ELIG CLIN: HCPCS | Performed by: INTERNAL MEDICINE

## 2024-04-04 PROCEDURE — 99203 OFFICE O/P NEW LOW 30 MIN: CPT | Performed by: INTERNAL MEDICINE

## 2024-04-04 ASSESSMENT — SLEEP AND FATIGUE QUESTIONNAIRES
HOW LIKELY ARE YOU TO NOD OFF OR FALL ASLEEP IN A CAR, WHILE STOPPED FOR A FEW MINUTES IN TRAFFIC: WOULD NEVER DOZE
NUMBER OF TIMES YOU WAKE PER NIGHT: 1
FOSQ SCORE: 19.5
DO YOU HAVE DIFFICULTY BEING AS ACTIVE AS YOU WANT TO BE IN THE EVENING BECAUSE YOU ARE SLEEPY OR TIRED: NO
HOW LIKELY ARE YOU TO NOD OFF OR FALL ASLEEP WHEN YOU ARE A PASSENGER IN A CAR FOR AN HOUR WITHOUT A BREAK: WOULD NEVER DOZE
HOW LIKELY ARE YOU TO NOD OFF OR FALL ASLEEP WHILE SITTING QUIETLY AFTER LUNCH WITHOUT ALCOHOL: WOULD NEVER DOZE
AVERAGE NUMBER OF SLEEP HOURS: 7
DO YOU HAVE DIFFICULTY VISITING YOUR FAMILY OR FRIENDS IN THEIR HOME BECAUSE YOU BECOME SLEEPY OR TIRED: NO
DO YOU HAVE PROBLEMS WITH FREQUENT AWAKENINGS AT NIGHT: YES
ARE YOU BOTHERED BY WAKING UP TOO EARLY AND NOT BEING ABLE TO GET BACK TO SLEEP: YES
HOW LIKELY ARE YOU TO NOD OFF OR FALL ASLEEP WHILE WATCHING TV: WOULD NEVER DOZE
HAS YOUR RELATIONSHIP WITH FAMILY, FRIENDS OR WORK COLLEAGUES BEEN AFFECTED BECAUSE YOU ARE SLEEPY OR TIRED: NO
DO YOU HAVE DIFFICULTY OPERATING A MOTOR VEHICLE FOR SHORT DISTANCES (LESS THAN 100 MILES) BECAUSE YOU BECOME SLEEPY: NO
HOW LIKELY ARE YOU TO NOD OFF OR FALL ASLEEP WHILE SITTING QUIETLY AFTER LUNCH WITHOUT ALCOHOL: WOULD NEVER DOZE
HOW LIKELY ARE YOU TO NOD OFF OR FALL ASLEEP WHILE SITTING INACTIVE IN A PUBLIC PLACE: WOULD NEVER DOZE
DO YOU TAKE NAPS: NO
HOW LIKELY ARE YOU TO NOD OFF OR FALL ASLEEP WHILE SITTING AND READING: SLIGHT CHANCE OF DOZING
HAS YOUR MOOD BEEN AFFECTED BECAUSE YOU ARE SLEEPY OR TIRED: NO
DO YOU GET TOO LITTLE SLEEP AT NIGHT: YES
HOW LIKELY ARE YOU TO NOD OFF OR FALL ASLEEP WHILE SITTING AND TALKING TO SOMEONE: WOULD NEVER DOZE
HOW LIKELY ARE YOU TO NOD OFF OR FALL ASLEEP WHEN YOU ARE A PASSENGER IN A CAR FOR AN HOUR WITHOUT A BREAK: WOULD NEVER DOZE
SELECT ANY OF THE FOLLOWING BEHAVIORS OBSERVED WHILE YOU ARE ASLEEP: LIGHT SNORING
DO YOU HAVE DIFFICULTY BEING AS ACTIVE AS YOU WANT TO BE IN THE MORNING BECAUSE YOU ARE SLEEPY OR TIRED: YES, LITTLE
DO YOU WORK SHIFTS: NO
DO YOU GET TOO LITTLE SLEEP AT NIGHT: YES
DO YOU HAVE DIFFICULTY WATCHING A MOVIE OR VIDEO BECAUSE YOU BECOME SLEEPY OR TIRED: NO
ESS TOTAL SCORE: 1
HOW LIKELY ARE YOU TO NOD OFF OR FALL ASLEEP WHILE SITTING INACTIVE IN A PUBLIC PLACE: WOULD NEVER DOZE
WHAT TIME DO YOU USUALLY GO TO BED: 22:30
WHAT TIME DO YOU USUALLY WAKE UP: 06:10
HOW LIKELY ARE YOU TO NOD OFF OR FALL ASLEEP WHILE LYING DOWN TO REST IN THE AFTERNOON WHEN CIRCUMSTANCES PERMIT: WOULD NEVER DOZE
HOW LIKELY ARE YOU TO NOD OFF OR FALL ASLEEP WHILE SITTING AND READING: SLIGHT CHANCE OF DOZING
HOW LIKELY ARE YOU TO NOD OFF OR FALL ASLEEP IN A CAR, WHILE STOPPED FOR A FEW MINUTES IN TRAFFIC: WOULD NEVER DOZE
SELECT ANY OF THE FOLLOWING BEHAVIORS OBSERVED WHILE PATIENT ASLEEP: LIGHT SNORING;GRINDING TEETH
AVERAGE NUMBER OF SLEEP HOURS: 7
HOW LIKELY ARE YOU TO NOD OFF OR FALL ASLEEP WHILE WATCHING TV: WOULD NEVER DOZE
ARE YOU BOTHERED BY WAKING UP TOO EARLY AND NOT BEING ABLE TO GET BACK TO SLEEP: YES
DO YOU GENERALLY HAVE DIFFICULTY REMEMBERING THINGS BECAUSE YOU ARE SLEEPY OR TIRED: NO
SELECT ANY OF THE FOLLOWING BEHAVIORS OBSERVED WHILE YOU ARE ASLEEP: GRINDING TEETH
HOW LIKELY ARE YOU TO NOD OFF OR FALL ASLEEP WHILE SITTING AND TALKING TO SOMEONE: WOULD NEVER DOZE
DO YOU HAVE DIFFICULTY CONCENTRATING ON THE THINGS YOU DO BECAUSE YOU ARE SLEEPY OR TIRED: NO
HOW LIKELY ARE YOU TO NOD OFF OR FALL ASLEEP WHILE LYING DOWN TO REST IN THE AFTERNOON WHEN CIRCUMSTANCES PERMIT: WOULD NEVER DOZE
DO YOU HAVE DIFFICULTY OPERATING A MOTOR VEHICLE FOR LONG DISTANCES (GREATER THAN 100 MILES) BECAUSE YOU BECOME SLEEPY: NO

## 2024-04-04 NOTE — PROGRESS NOTES
Emre Meadows, was evaluated through a synchronous (real-time) audio-video encounter. The patient (or guardian if applicable) is aware that this is a billable service, which includes applicable co-pays. This Virtual Visit was conducted with patient's (and/or legal guardian's) consent. Patient identification was verified, and a caregiver was present when appropriate.   The patient was located at Home: [id number] [place] [id number]  Provider was located at Home (Appt Dept State): [place]  Confirm you are appropriately licensed, registered, or certified to deliver care in the state where the patient is located as indicated above. If you are not or unsure, please re-schedule the visit: Yes, I confirm.      Total time spent for this encounter: Not billed by time    --Nette Bone MD on 4/4/2024 at 2:34 PM    An electronic signature was used to authenticate this note.         Patient called and identity confirmed with 2 patient identifers     Emre Meadows is a 34 y.o. male who was seen by synchronous (real-time) audio-video technology on 4/4/2024.           --Nette Bone MD on 4/4/2024 at 2:34 PM                                 [address]  [place] [address]  Tel.  [phone]  Fax. [phone] [address]  [place] [address]  Tel.  [phone]  Fax. [phone] [address] [place] [place] [address]  Tel.  [phone]  Fax. [phone]         Subjective:             Emre Meadows is an 34 y.o. male  referred for evaluation for a sleep disorder.       He complains of snoring associated with feels tired in morning when wakes with alarm.  Symptoms began several months ago, unchanged since that time. He usually can fall asleep in less than 60 minutes minutes.  Family or house members note snoring. He denies falling asleep while driving.  .  Emre Meadows does wake up frequently at night. He is bothered by waking up too early and left unable to get back to sleep. He

## 2024-04-04 NOTE — PATIENT INSTRUCTIONS
label. Alcohol naturally makes you sleepy and on its own can cause accidents. Combined with excessive drowsiness its effects are amplified.   Signs of Drowsy Driving:   * You don't remember driving the last few miles   * You may drift out of your taylor   * You are unable to focus and your thoughts wander   * You may yawn more often than normal   * You have difficulty keeping your eyes open / nodding off   * Missing traffic signs, speeding, or tailgating  Prevention-   Good sleep hygiene, lifestyle and behavioral choices have the most impact on drowsy driving. There is no substitute for sleep and the average person requires 8 hours nightly. If you find yourself driving drowsy, stop and sleep. Consider the sleep hygiene tips provided during your visit as well.     Medication Refill Policy: Refills for all medications require 1 week advance notice. Please have your pharmacy fax a refill request. We are unable to fax, or call in \"controled substance\" medications and you will need to pick these prescriptions up from our office.

## 2024-06-05 ENCOUNTER — HOSPITAL ENCOUNTER (OUTPATIENT)
Facility: HOSPITAL | Age: 34
Discharge: HOME | End: 2024-06-08
Payer: COMMERCIAL

## 2024-06-05 ENCOUNTER — PROCEDURE VISIT (OUTPATIENT)
Age: 34
End: 2024-06-05

## 2024-06-05 DIAGNOSIS — G47.33 OSA (OBSTRUCTIVE SLEEP APNEA): Primary | ICD-10-CM

## 2024-06-05 PROCEDURE — 95800 SLP STDY UNATTENDED: CPT | Performed by: INTERNAL MEDICINE

## 2024-06-05 NOTE — PROGRESS NOTES
Emre Meadows is seen today to receive a WatchPAT home sleep apnea testing (HSAT) device.    The WatchPAT HSAT Agreement was reviewed and endorsed by patient.  General information regarding operation of the HSAT device was provided.  Patient was advised to watch the WatchPAT instructional video.  Patient was advised to contact patient support for any problems using the device.  Patient was advised to complete the Morning Questionnaire after awakening/ending the test.    There were no vitals taken for this visit.    Patient will return the home sleep testing unit by noon unless otherwise approved.  Patient will be contacted once the results have been reviewed by the physician, typically within 10-15 business days.    AjungoT Serial Number [id number]

## 2024-06-12 ENCOUNTER — TELEPHONE (OUTPATIENT)
Age: 34
End: 2024-06-12

## 2024-06-12 ENCOUNTER — CLINICAL DOCUMENTATION (OUTPATIENT)
Age: 34
End: 2024-06-12

## 2024-06-12 DIAGNOSIS — G47.33 OBSTRUCTIVE SLEEP APNEA (ADULT) (PEDIATRIC): Primary | ICD-10-CM

## 2024-06-12 NOTE — TELEPHONE ENCOUNTER
Reviewed sleep study results with patient. He expressed understanding and would like to proceed with oral appliance therapy.    Order needed for referral.    Front office to fax dental referral and schedule follow up in 3-4 months.

## 2024-06-12 NOTE — PROGRESS NOTES
HSAT in r-drive.    Tech to convey results to patient          Your test showed mild sleep apnea.    Apnea/hypopnea index was 9/hour. Mild (lowest 88%) oxygen desaturations. Some  snoring noted.     Along with weight loss, treatment options include      CPAP would take care of all apneas and snoring regardless of position of sleep. If he  is agreeable to use this at night when sleeping , I will order it  and see him after he  has used it a month or so. CPAP is the gold standard for treatment of sleep apnea.   Mouthpiece/dental device-can reduce apneas but they do not work well if sleeping on back so the positioning device is needed with this option in addition to the mouthpiece. Generally, snoring will be reduced but not eliminated.   Avoiding all sleeping on back. I would recommend he  do this with assistance of a positioning device which looks like a big fannypack that is worn to bed to keep off back position (Slumber bump or sleep noodle). These can be purchased online or can be made by taking a fannypack and stuffing it with 2-3 tennis balls.

## 2024-06-13 ENCOUNTER — CLINICAL DOCUMENTATION (OUTPATIENT)
Age: 34
End: 2024-06-13

## 2024-06-26 ENCOUNTER — CLINICAL DOCUMENTATION (OUTPATIENT)
Age: 34
End: 2024-06-26

## 2024-07-22 ENCOUNTER — OFFICE VISIT (OUTPATIENT)
Age: 34
End: 2024-07-22
Payer: COMMERCIAL

## 2024-07-22 VITALS
OXYGEN SATURATION: 97 % | HEART RATE: 64 BPM | DIASTOLIC BLOOD PRESSURE: 74 MMHG | HEIGHT: 72 IN | TEMPERATURE: 99.1 F | WEIGHT: 210.4 LBS | BODY MASS INDEX: 28.5 KG/M2 | SYSTOLIC BLOOD PRESSURE: 139 MMHG

## 2024-07-22 DIAGNOSIS — I10 PRIMARY HYPERTENSION: Primary | ICD-10-CM

## 2024-07-22 DIAGNOSIS — E78.00 ELEVATED CHOLESTEROL: ICD-10-CM

## 2024-07-22 DIAGNOSIS — L72.0 EPIDERMOID CYST OF SKIN OF BACK: ICD-10-CM

## 2024-07-22 DIAGNOSIS — E66.3 OVERWEIGHT (BMI 25.0-29.9): ICD-10-CM

## 2024-07-22 DIAGNOSIS — G47.33 MILD OBSTRUCTIVE SLEEP APNEA: ICD-10-CM

## 2024-07-22 PROCEDURE — G8417 CALC BMI ABV UP PARAM F/U: HCPCS | Performed by: STUDENT IN AN ORGANIZED HEALTH CARE EDUCATION/TRAINING PROGRAM

## 2024-07-22 PROCEDURE — 99214 OFFICE O/P EST MOD 30 MIN: CPT | Performed by: STUDENT IN AN ORGANIZED HEALTH CARE EDUCATION/TRAINING PROGRAM

## 2024-07-22 PROCEDURE — 1036F TOBACCO NON-USER: CPT | Performed by: STUDENT IN AN ORGANIZED HEALTH CARE EDUCATION/TRAINING PROGRAM

## 2024-07-22 PROCEDURE — 3075F SYST BP GE 130 - 139MM HG: CPT | Performed by: STUDENT IN AN ORGANIZED HEALTH CARE EDUCATION/TRAINING PROGRAM

## 2024-07-22 PROCEDURE — G8427 DOCREV CUR MEDS BY ELIG CLIN: HCPCS | Performed by: STUDENT IN AN ORGANIZED HEALTH CARE EDUCATION/TRAINING PROGRAM

## 2024-07-22 PROCEDURE — 3078F DIAST BP <80 MM HG: CPT | Performed by: STUDENT IN AN ORGANIZED HEALTH CARE EDUCATION/TRAINING PROGRAM

## 2024-07-22 ASSESSMENT — ENCOUNTER SYMPTOMS
CONSTIPATION: 0
BLOOD IN STOOL: 0
DIARRHEA: 0
VOMITING: 0
ABDOMINAL PAIN: 0
NAUSEA: 0
COUGH: 0
SHORTNESS OF BREATH: 0

## 2024-07-22 NOTE — PROGRESS NOTES
Emre Meadows (:  1990) is a 34 y.o. male, Established patient, here for evaluation of the following chief complaint(s):  Follow-up (Pt stated he has a bump in his Rt shoulder for couple wks.)        Subjective   SUBJECTIVE/OBJECTIVE:  Patient presents today for follow-up for the following:    Hypertension  - Home BP readings: Normal at home  - BP today 139/74  - Medications: Chlorthalidone 25 mg daily, did not need the amlodipine      - Compliance: yes  - Reviewed over Diet and exercise:   - Recommended low salt diet, DASH diet  - Recommended 150 mins mod intensity weekly    Hyperlipidemia:  - Last lipid panel: 23. Total: 277, T, LDL: 158, HDL: 43  - 10 year ASCVD Risk: unable to calculate due to age <40  - Statin therapy: no  - Reviewed over diet previously and patient is working on changes    Obesity  - Reviewed over weight:  Last Weight Metrics:      2024    11:57 AM 2024     8:35 AM 1/3/2024    10:02 AM 2023    10:43 AM 2022     9:56 AM 3/28/2022    10:24 AM   Weight Loss Metrics   Height 6' 0\" 6' 0\" 6' 0\" 6' 0\" 6' 0\" 6' 0\"   Weight - Scale 210 lbs 6 oz 216 lbs 10 oz 221 lbs 13 oz 221 lbs 10 oz 210 lbs 212 lbs 13 oz   BMI (Calculated) 28.6 kg/m2 29.4 kg/m2 30.1 kg/m2 30.1 kg/m2 28.5 kg/m2 28.9 kg/m2   - Patient has lost 5 lbs - encouraged ongoing weight loss    Snoring - Had sleep study - mild sleep apnea - He did not want to be on CPAP, has had new pillow which has helped.       Preventative care:  Health Maintenance Due   Topic Date Due    Hepatitis B vaccine (1 of 3 - 3-dose series) Never done    Varicella vaccine (1 of 2 - 2-dose childhood series) Never done          Past Medical History:   Diagnosis Date    Class 1 obesity due to excess calories with serious comorbidity and body mass index (BMI) of 30.0 to 30.9 in adult 2024    Hypertension     Kidney stone 2019     History reviewed. No pertinent surgical history.   Family History   Problem Relation Age of

## 2024-07-22 NOTE — PROGRESS NOTES
RM14    Chief Complaint   Patient presents with    Follow-up     Pt stated he has a bump in his Rt shoulder for couple wks.       Vitals:    07/22/24 1157   BP: 139/74   Site: Left Upper Arm   Position: Sitting   Pulse: 64   Temp: 99.1 °F (37.3 °C)   TempSrc: Oral   SpO2: 97%   Weight: 95.4 kg (210 lb 6.4 oz)   Height: 1.829 m (6')            \"Have you been to the ER, urgent care clinic since your last visit?  Hospitalized since your last visit?\"    NO    “Have you seen or consulted any other health care providers outside of [facility] since your last visit?”    NO          Click Here for Release of Records Request   AVS  education, follow up, and recommendations provided and addressed with patient.  services used to advise patient No

## 2024-07-23 LAB
ANION GAP SERPL CALC-SCNC: 7 MMOL/L (ref 5–15)
BUN SERPL-MCNC: 16 MG/DL (ref 6–20)
BUN/CREAT SERPL: 17 (ref 12–20)
CALCIUM SERPL-MCNC: 9.7 MG/DL (ref 8.5–10.1)
CHLORIDE SERPL-SCNC: 100 MMOL/L (ref 97–108)
CO2 SERPL-SCNC: 30 MMOL/L (ref 21–32)
CREAT SERPL-MCNC: 0.96 MG/DL (ref 0.7–1.3)
GLUCOSE SERPL-MCNC: 95 MG/DL (ref 65–100)
MAGNESIUM SERPL-MCNC: 2.5 MG/DL (ref 1.6–2.4)
POTASSIUM SERPL-SCNC: 3.7 MMOL/L (ref 3.5–5.1)
SODIUM SERPL-SCNC: 137 MMOL/L (ref 136–145)

## 2024-10-28 ENCOUNTER — OFFICE VISIT (OUTPATIENT)
Age: 34
End: 2024-10-28

## 2024-10-28 VITALS
DIASTOLIC BLOOD PRESSURE: 87 MMHG | SYSTOLIC BLOOD PRESSURE: 131 MMHG | TEMPERATURE: 98.7 F | RESPIRATION RATE: 16 BRPM | HEART RATE: 66 BPM | OXYGEN SATURATION: 100 % | WEIGHT: 207 LBS | BODY MASS INDEX: 28.07 KG/M2

## 2024-10-28 DIAGNOSIS — H65.93 BILATERAL OTITIS MEDIA WITH EFFUSION: Primary | ICD-10-CM

## 2024-10-28 RX ORDER — FLUTICASONE PROPIONATE 50 MCG
2 SPRAY, SUSPENSION (ML) NASAL DAILY
Qty: 16 G | Refills: 0 | Status: SHIPPED | OUTPATIENT
Start: 2024-10-28

## 2024-10-28 NOTE — PATIENT INSTRUCTIONS
Patient was seen today for bilateral ear fullness  There is no evidence of acute otitis media or cerumen impaction on exam  Physical exam suggests otitis media with effusion which is a buildup of fluid behind the eardrums  Recommendations for this include the following:  Systemic decongestants like Sudafed and topical decongestants like Afrin can be used daily  Auto insufflation (plugging your nose and gently blowing out) can help the ears to drain  Start daily Flonase, 1 to 2 sprays in each nostril daily for the next 1 to 2 weeks  This problem generally clears on its own over several weeks  If not improving I would recommend following up with ear nose and throat for further assessment

## 2024-10-28 NOTE — PROGRESS NOTES
Emre Meadows (:  1990) is a 34 y.o. male,New patient, here for evaluation of the following chief complaint(s):  Ear Fullness (Bilateral ears clogged x1 week)      Assessment & Plan :  Visit Diagnoses and Associated Orders       Bilateral otitis media with effusion    -  Primary    fluticasone (FLONASE) 50 MCG/ACT nasal spray [87369]                   Patient was seen today for bilateral ear fullness  There is no evidence of acute otitis media or cerumen impaction on exam  Physical exam suggests otitis media with effusion which is a buildup of fluid behind the eardrums  Recommendations for this include the following:  Systemic decongestants like Sudafed and topical decongestants like Afrin can be used daily  Auto insufflation (plugging your nose and gently blowing out) can help the ears to drain  Start daily Flonase, 1 to 2 sprays in each nostril daily for the next 1 to 2 weeks  This problem generally clears on its own over several weeks  If not improving I would recommend following up with ear nose and throat for further assessment       Subjective :    Ear Fullness          34 y.o. male presents with symptoms of bilateral ear fullness for the past 1 week.  States he is currently getting over a cold.  He denies any significant ear pain.  He has had a history of cerumen impaction which has impacted his hearing and he states this feels similar.  Denies fevers, chills, body aches or fatigue.  Denies any drainage or discharge from his ears.  He does still report some mild nasal congestion, but denies any other severe sinus symptoms.         Vitals:    10/28/24 0812   BP: 131/87   Site: Right Upper Arm   Position: Sitting   Cuff Size: Medium Adult   Pulse: 66   Resp: 16   Temp: 98.7 °F (37.1 °C)   SpO2: 100%   Weight: 93.9 kg (207 lb)       No results found for this visit on 10/28/24.      Objective   Physical Exam  Vitals and nursing note reviewed.   Constitutional:       General: He is not in acute

## 2024-11-08 ENCOUNTER — OFFICE VISIT (OUTPATIENT)
Age: 34
End: 2024-11-08

## 2024-11-08 VITALS
TEMPERATURE: 98.4 F | DIASTOLIC BLOOD PRESSURE: 87 MMHG | BODY MASS INDEX: 28.07 KG/M2 | SYSTOLIC BLOOD PRESSURE: 136 MMHG | RESPIRATION RATE: 16 BRPM | OXYGEN SATURATION: 99 % | HEART RATE: 60 BPM | WEIGHT: 207 LBS

## 2024-11-08 DIAGNOSIS — B37.2 INTERTRIGO OF GENITOCRURAL REGION DUE TO CANDIDA SPECIES: Primary | ICD-10-CM

## 2024-11-08 RX ORDER — CLOTRIMAZOLE 1 %
CREAM (GRAM) TOPICAL
Qty: 60 G | Refills: 0 | Status: SHIPPED | OUTPATIENT
Start: 2024-11-08 | End: 2024-11-15

## 2024-11-08 NOTE — PATIENT INSTRUCTIONS
Patient was seen today for a rash to his genitals which is consistent with a candidal intertrigo  Will treat with clotrimazole cream, apply topically twice daily for 1 to 2 weeks  Ensure the area is thoroughly dried after showering  You can consider applying a drying powder like Goldbond to absorb excess moisture  Wear breathable clothing  Please follow-up here or with PCP if symptoms persist

## 2024-11-08 NOTE — PROGRESS NOTES
Emre Meadows (:  1990) is a 34 y.o. male,Established patient, here for evaluation of the following chief complaint(s):  Rash (Pt present for Rash on genital x 3 weeks. Sx itching, discomfort, red)      Assessment & Plan :  Visit Diagnoses and Associated Orders       Intertrigo of genitocrural region due to Candida species    -  Primary    clotrimazole (LOTRIMIN AF) 1 % cream [[id number]]                   Patient was seen today for a rash to his genitals which is consistent with a candidal intertrigo  Will treat with clotrimazole cream, apply topically twice daily for 1 to 2 weeks  Ensure the area is thoroughly dried after showering  You can consider applying a drying powder like Goldbond to absorb excess moisture  Wear breathable clothing  Please follow-up here or with PCP if symptoms persist       Subjective :    Rash         34 y.o. male presents with symptoms of dry rash to his genital area for the past 2 to 3 weeks.  States that he initially noticed it after having intercourse with his wife.  The rash did improve afterwards but it reappeared the next time they had intercourse.  This occurred for a third time and he reports less improvement of his symptoms over the past week.  He does report some mild itching, but notes primarily some redness and dryness to the shaft of the penis.  Denies any lesions.  Denies dysuria, hematuria or changes in urinary frequency.  Denies any systemic symptoms like fevers, chills, body aches or fatigue.  Denies any concerns for STIs.  He states that his wife is not having any symptoms whatsoever.  He reports some concern for a fungal type infection.  Has been applying Vaseline with mild relief of symptoms.         Vitals:    24 0815   BP: 136/87   Site: Left Upper Arm   Position: Sitting   Cuff Size: Medium Adult   Pulse: 60   Resp: 16   Temp: 98.4 °F (36.9 °C)   SpO2: 99%   Weight: 93.9 kg (207 lb)       No results found for this visit on 24.      Objective

## 2024-12-11 ENCOUNTER — OFFICE VISIT (OUTPATIENT)
Age: 34
End: 2024-12-11

## 2024-12-11 VITALS
WEIGHT: 210 LBS | BODY MASS INDEX: 28.48 KG/M2 | TEMPERATURE: 98.1 F | RESPIRATION RATE: 16 BRPM | DIASTOLIC BLOOD PRESSURE: 76 MMHG | HEART RATE: 67 BPM | OXYGEN SATURATION: 98 % | SYSTOLIC BLOOD PRESSURE: 118 MMHG

## 2024-12-11 DIAGNOSIS — B37.2 CANDIDAL INTERTRIGO: Primary | ICD-10-CM

## 2024-12-11 RX ORDER — KETOCONAZOLE 20 MG/G
CREAM TOPICAL
Qty: 30 G | Refills: 1 | Status: SHIPPED | OUTPATIENT
Start: 2024-12-11

## 2024-12-11 NOTE — PATIENT INSTRUCTIONS
Symptoms are most consistent with a candidal intertrigo of the gluteal crease  This is likely a fungal infection, I recommend treatment with ketoconazole once daily  Ensure you are drying the area well after showering, you can even use a hair dryer set to a low setting and apply a drying powder such as Goldbond  Because the area is still inflamed and irritated, you can apply a low potency topical steroid like hydrocortisone  Wear breathable underwear

## 2024-12-11 NOTE — PROGRESS NOTES
Emre Meadows (:  1990) is a 34 y.o. male,Established patient, here for evaluation of the following chief complaint(s):  Rash (Rash to buttock area x2 weeks)      Assessment & Plan :  Visit Diagnoses and Associated Orders       Candidal intertrigo    -  Primary    ketoconazole (NIZORAL) 2 % cream [[id number]]                   Symptoms are most consistent with a candidal intertrigo of the gluteal crease  This is likely a fungal infection, I recommend treatment with ketoconazole once daily  Ensure you are drying the area well after showering, you can even use a hair dryer set to a low setting and apply a drying powder such as Goldbond  Because the area is still inflamed and irritated, you can apply a low potency topical steroid like hydrocortisone twice daily for a short time  Wear breathable underwear  Please monitor symptoms, follow-up with PCP or dermatologist if symptoms persist       Subjective :    Rash         34 y.o. male presents with symptoms of an uncomfortable, burning rash along his gluteal crease.  He was seen here about 1 month ago with a red itchy rash to his genital area.  He was treated with clotrimazole cream and reported good resolution of his symptoms with this.  About 1 week ago he developed similar symptoms around his anus.  He has been applying the same antifungal cream without notable improvement over the past week.  Is also tried applying Preparation H which did provide temporary relief of his symptoms.  He denies fevers, chills, body aches or fatigue.  Denies any recent trauma or injury to the affected area.  Denies any new soaps, shampoos or skin care products.  He does have an appointment scheduled with his primary care provider next month.         Vitals:    24 0827   BP: 118/76   Site: Right Upper Arm   Position: Sitting   Cuff Size: Medium Adult   Pulse: 67   Resp: 16   Temp: 98.1 °F (36.7 °C)   SpO2: 98%   Weight: 95.3 kg (210 lb)       No results found for this visit on

## 2024-12-16 DIAGNOSIS — L08.9 STREPTOCOCCAL SKIN INFECTION: Primary | ICD-10-CM

## 2024-12-16 RX ORDER — MUPIROCIN 20 MG/G
OINTMENT TOPICAL 3 TIMES DAILY
Qty: 30 G | Refills: 0 | Status: SHIPPED | OUTPATIENT
Start: 2024-12-16 | End: 2024-12-23

## 2024-12-18 ENCOUNTER — OFFICE VISIT (OUTPATIENT)
Age: 34
End: 2024-12-18
Payer: COMMERCIAL

## 2024-12-18 VITALS
HEIGHT: 72 IN | DIASTOLIC BLOOD PRESSURE: 87 MMHG | SYSTOLIC BLOOD PRESSURE: 133 MMHG | TEMPERATURE: 97.9 F | HEART RATE: 67 BPM | OXYGEN SATURATION: 98 % | BODY MASS INDEX: 28.39 KG/M2 | WEIGHT: 209.6 LBS

## 2024-12-18 DIAGNOSIS — L73.9 FOLLICULITIS: ICD-10-CM

## 2024-12-18 DIAGNOSIS — B37.49 ANOGENITAL CANDIDIASIS IN MALE: Primary | ICD-10-CM

## 2024-12-18 DIAGNOSIS — B37.89 ANOGENITAL CANDIDIASIS IN MALE: Primary | ICD-10-CM

## 2024-12-18 PROBLEM — N20.1 URETERIC STONE: Status: RESOLVED | Noted: 2019-02-08 | Resolved: 2024-12-18

## 2024-12-18 PROCEDURE — 1036F TOBACCO NON-USER: CPT | Performed by: STUDENT IN AN ORGANIZED HEALTH CARE EDUCATION/TRAINING PROGRAM

## 2024-12-18 PROCEDURE — G8427 DOCREV CUR MEDS BY ELIG CLIN: HCPCS | Performed by: STUDENT IN AN ORGANIZED HEALTH CARE EDUCATION/TRAINING PROGRAM

## 2024-12-18 PROCEDURE — G8484 FLU IMMUNIZE NO ADMIN: HCPCS | Performed by: STUDENT IN AN ORGANIZED HEALTH CARE EDUCATION/TRAINING PROGRAM

## 2024-12-18 PROCEDURE — G8417 CALC BMI ABV UP PARAM F/U: HCPCS | Performed by: STUDENT IN AN ORGANIZED HEALTH CARE EDUCATION/TRAINING PROGRAM

## 2024-12-18 PROCEDURE — 3079F DIAST BP 80-89 MM HG: CPT | Performed by: STUDENT IN AN ORGANIZED HEALTH CARE EDUCATION/TRAINING PROGRAM

## 2024-12-18 PROCEDURE — 99213 OFFICE O/P EST LOW 20 MIN: CPT | Performed by: STUDENT IN AN ORGANIZED HEALTH CARE EDUCATION/TRAINING PROGRAM

## 2024-12-18 PROCEDURE — 3075F SYST BP GE 130 - 139MM HG: CPT | Performed by: STUDENT IN AN ORGANIZED HEALTH CARE EDUCATION/TRAINING PROGRAM

## 2024-12-18 RX ORDER — NYSTATIN 100000 U/G
CREAM TOPICAL
Qty: 30 G | Refills: 2 | Status: SHIPPED | OUTPATIENT
Start: 2024-12-18

## 2024-12-18 RX ORDER — AMOXICILLIN 500 MG/1
500 CAPSULE ORAL 2 TIMES DAILY
COMMUNITY
Start: 2024-12-16

## 2024-12-18 ASSESSMENT — PATIENT HEALTH QUESTIONNAIRE - PHQ9
1. LITTLE INTEREST OR PLEASURE IN DOING THINGS: NOT AT ALL
SUM OF ALL RESPONSES TO PHQ QUESTIONS 1-9: 0
SUM OF ALL RESPONSES TO PHQ QUESTIONS 1-9: 0
2. FEELING DOWN, DEPRESSED OR HOPELESS: NOT AT ALL
SUM OF ALL RESPONSES TO PHQ QUESTIONS 1-9: 0
SUM OF ALL RESPONSES TO PHQ9 QUESTIONS 1 & 2: 0
SUM OF ALL RESPONSES TO PHQ QUESTIONS 1-9: 0

## 2024-12-18 ASSESSMENT — ENCOUNTER SYMPTOMS
ABDOMINAL PAIN: 0
NAUSEA: 0
DIARRHEA: 0
SHORTNESS OF BREATH: 0
COUGH: 0
VOMITING: 0
CONSTIPATION: 0
BLOOD IN STOOL: 0

## 2024-12-18 NOTE — PROGRESS NOTES
RM13    Chief Complaint   Patient presents with    Follow-up     Pt stated he has genital skin rash for 2 wks.       Vitals:    12/18/24 0915   BP: 133/87   Site: Left Upper Arm   Position: Sitting   Pulse: 67   Temp: 97.9 °F (36.6 °C)   TempSrc: Oral   SpO2: 98%   Weight: 95.1 kg (209 lb 9.6 oz)   Height: 1.829 m (6')            \"Have you been to the ER, urgent care clinic since your last visit?  Hospitalized since your last visit?\"    YES - When: approximately 2  weeks ago.  Where and Why: for genital skin rash..    “Have you seen or consulted any other health care providers outside of [facility] since your last visit?”    NO          Click Here for Release of Records Request   AVS  education, follow up, and recommendations provided and addressed with patient.  services used to advise patient No

## 2024-12-18 NOTE — PROGRESS NOTES
Emre Meadows (:  1990) is a 34 y.o. male,Established patient, here for evaluation of the following chief complaint(s):  Follow-up (Pt stated he has genital skin rash for 2 wks.)        Subjective   SUBJECTIVE/OBJECTIVE:  Patient is here today for an acute visit for the following:    Patient has rash on penis that started in October. He was diagnosed with a fungal infection and given Lotrimin AF. IT cleared up slowly. However the rash returned in December - saw Urgent Care - given Ketoconazole but didn't help. Rash worsened again about a week ago. Seem to migrated to buttock area. Was seen in urgent care for strep because family had it. Negative for strep A, but thought could be strep B so started on Amoxicillin. He has also been doing the Mupirocin. Overall, everything seems to be improving.    No fever. No discharge currently. It was both itchy and painful. Not as itchy now and improved symptoms with regards to pain.    Past Medical History:   Diagnosis Date    Class 1 obesity due to excess calories with serious comorbidity and body mass index (BMI) of 30.0 to 30.9 in adult 2024    Hypertension     Kidney stone 2019    Ureteric stone 2019     History reviewed. No pertinent surgical history.   Family History   Problem Relation Age of Onset    Sleep Apnea Mother     Hypertension Father     Cancer Paternal Grandfather         bladder     Social History     Socioeconomic History    Marital status:      Spouse name: Not on file    Number of children: Not on file    Years of education: Not on file    Highest education level: Not on file   Occupational History    Not on file   Tobacco Use    Smoking status: Never    Smokeless tobacco: Never   Vaping Use    Vaping status: Never Used   Substance and Sexual Activity    Alcohol use: Yes     Alcohol/week: 4.0 standard drinks of alcohol     Types: 2 Glasses of wine, 2 Cans of beer per week    Drug use: Never    Sexual activity: Not on file  The patient is a 49y Female complaining of ankle pain/injury.

## 2025-01-31 ENCOUNTER — OFFICE VISIT (OUTPATIENT)
Age: 35
End: 2025-01-31

## 2025-01-31 VITALS
BODY MASS INDEX: 28.51 KG/M2 | OXYGEN SATURATION: 97 % | SYSTOLIC BLOOD PRESSURE: 128 MMHG | HEART RATE: 70 BPM | TEMPERATURE: 99.3 F | DIASTOLIC BLOOD PRESSURE: 87 MMHG | WEIGHT: 210.2 LBS

## 2025-01-31 DIAGNOSIS — Z00.00 VISIT FOR WELL MAN HEALTH CHECK: Primary | ICD-10-CM

## 2025-01-31 DIAGNOSIS — G47.33 MILD OBSTRUCTIVE SLEEP APNEA: ICD-10-CM

## 2025-01-31 DIAGNOSIS — B37.89 ANOGENITAL CANDIDIASIS IN MALE: ICD-10-CM

## 2025-01-31 DIAGNOSIS — L72.0 EPIDERMOID CYST OF SKIN OF BACK: ICD-10-CM

## 2025-01-31 DIAGNOSIS — Z13.1 SCREENING FOR DIABETES MELLITUS: ICD-10-CM

## 2025-01-31 DIAGNOSIS — B37.49 ANOGENITAL CANDIDIASIS IN MALE: ICD-10-CM

## 2025-01-31 DIAGNOSIS — I10 PRIMARY HYPERTENSION: ICD-10-CM

## 2025-01-31 DIAGNOSIS — Z13.29 SCREENING FOR THYROID DISORDER: ICD-10-CM

## 2025-01-31 DIAGNOSIS — E78.00 ELEVATED CHOLESTEROL: ICD-10-CM

## 2025-01-31 DIAGNOSIS — E66.3 OVERWEIGHT (BMI 25.0-29.9): ICD-10-CM

## 2025-01-31 ASSESSMENT — PATIENT HEALTH QUESTIONNAIRE - PHQ9
SUM OF ALL RESPONSES TO PHQ QUESTIONS 1-9: 0
1. LITTLE INTEREST OR PLEASURE IN DOING THINGS: NOT AT ALL
SUM OF ALL RESPONSES TO PHQ9 QUESTIONS 1 & 2: 0
SUM OF ALL RESPONSES TO PHQ QUESTIONS 1-9: 0
2. FEELING DOWN, DEPRESSED OR HOPELESS: NOT AT ALL

## 2025-01-31 ASSESSMENT — ENCOUNTER SYMPTOMS
CHEST TIGHTNESS: 0
EYE PAIN: 0
ABDOMINAL PAIN: 0
CONSTIPATION: 0
BLOOD IN STOOL: 0
DIARRHEA: 0
VOMITING: 0
NAUSEA: 0
SHORTNESS OF BREATH: 0
WHEEZING: 0
COUGH: 0
TROUBLE SWALLOWING: 0

## 2025-01-31 NOTE — PROGRESS NOTES
RM 14    Chief Complaint   Patient presents with    Annual Exam       Vitals:    01/31/25 0841   BP: 128/87   Site: Left Upper Arm   Position: Sitting   Pulse: 70   Temp: 99.3 °F (37.4 °C)   TempSrc: Oral   SpO2: 97%   Weight: 95.3 kg (210 lb 3.2 oz)        \"Have you been to the ER, urgent care clinic since your last visit?  Hospitalized since your last visit?\"    NO    “Have you seen or consulted any other health care providers outside of [facility] since your last visit?”    NO          Click Here for Release of Records Request   AVS  education, follow up, and recommendations provided and addressed with patient.  services used to advise patient No

## 2025-01-31 NOTE — PROGRESS NOTES
Emre Meadows (:  1990) is a 35 y.o. male, Established patient, here for evaluation of the following chief complaint(s):  Annual Exam        Subjective   SUBJECTIVE/OBJECTIVE:  Patient presents today for follow-up for the following:    Anogenital rash - was diagnosed with candidiasis - given Nystatin, improved a bit, but still ongoing    Hypertension  - Home BP readings: Normal at home  - BP today 128/87  - Medications: Chlorthalidone 25 mg daily      - Compliance: yes  - Reviewed over Diet and exercise:   - Recommended low salt diet, DASH diet  - Recommended 150 mins mod intensity weekly    Hyperlipidemia:  - Last lipid panel: 23. Total: 277, T, LDL: 158, HDL: 43  - 10 year ASCVD Risk: unable to calculate due to age <40, he is fasting today  - Statin therapy: no  - Reviewed over diet previously and patient is working on changes    Obesity  - Reviewed over weight:  Last Weight Metrics:      2025     8:41 AM 2024     9:15 AM 2024     8:27 AM 2024     8:15 AM 10/28/2024     8:12 AM 2024    11:57 AM 2024     8:35 AM   Weight Loss Metrics   Height  6' 0\"    6' 0\" 6' 0\"   Weight - Scale 210 lbs 3 oz 209 lbs 10 oz 210 lbs 207 lbs 207 lbs 210 lbs 6 oz 216 lbs 10 oz   BMI (Calculated) 0 kg/m2 28.5 kg/m2 0 kg/m2 0 kg/m2 0 kg/m2 28.6 kg/m2 29.4 kg/m2       Snoring - Mild sleep apnea on sleep study done 24 - He did not want to be on CPAP, has had new pillow which has helped.     Patient is here today for annual wellness exam    EtOH/Tobacco/Drugs  - Tobacco: No  - EtOH: 4 drinks a week  - Other: No  - Denies other drug use  - Feels safe at home    Preventative care  - BP screen: see above  - Lipid panel: See above  - Weight/BMI: See above  - Diabetes: See above  - Lung cancer: not indicated  - Prostate cancer: not indicated, patient asymptomatic  - Colorectal cancer: not indicated at this time, recommendation is to begin at age 45  - HIV: Discussed once in lifetime

## 2025-02-01 LAB
ALBUMIN SERPL-MCNC: 4.2 G/DL (ref 3.5–5)
ALBUMIN/GLOB SERPL: 1.2 (ref 1.1–2.2)
ALP SERPL-CCNC: 64 U/L (ref 45–117)
ALT SERPL-CCNC: 37 U/L (ref 12–78)
ANION GAP SERPL CALC-SCNC: 7 MMOL/L (ref 2–12)
AST SERPL-CCNC: 19 U/L (ref 15–37)
BASOPHILS # BLD: 0.06 K/UL (ref 0–0.1)
BASOPHILS NFR BLD: 1 % (ref 0–1)
BILIRUB SERPL-MCNC: 0.5 MG/DL (ref 0.2–1)
BUN SERPL-MCNC: 15 MG/DL (ref 6–20)
BUN/CREAT SERPL: 14 (ref 12–20)
CALCIUM SERPL-MCNC: 9.8 MG/DL (ref 8.5–10.1)
CHLORIDE SERPL-SCNC: 99 MMOL/L (ref 97–108)
CHOLEST SERPL-MCNC: 268 MG/DL
CO2 SERPL-SCNC: 31 MMOL/L (ref 21–32)
CREAT SERPL-MCNC: 1.08 MG/DL (ref 0.7–1.3)
DIFFERENTIAL METHOD BLD: ABNORMAL
EOSINOPHIL # BLD: 0.1 K/UL (ref 0–0.4)
EOSINOPHIL NFR BLD: 1.6 % (ref 0–7)
ERYTHROCYTE [DISTWIDTH] IN BLOOD BY AUTOMATED COUNT: 12.8 % (ref 11.5–14.5)
EST. AVERAGE GLUCOSE BLD GHB EST-MCNC: 108 MG/DL
GLOBULIN SER CALC-MCNC: 3.6 G/DL (ref 2–4)
GLUCOSE SERPL-MCNC: 93 MG/DL (ref 65–100)
HBA1C MFR BLD: 5.4 % (ref 4–5.6)
HCT VFR BLD AUTO: 49 % (ref 36.6–50.3)
HDLC SERPL-MCNC: 50 MG/DL
HDLC SERPL: 5.4 (ref 0–5)
HGB BLD-MCNC: 16.5 G/DL (ref 12.1–17)
IMM GRANULOCYTES # BLD AUTO: 0.04 K/UL (ref 0–0.04)
IMM GRANULOCYTES NFR BLD AUTO: 0.7 % (ref 0–0.5)
LDLC SERPL CALC-MCNC: 159.4 MG/DL (ref 0–100)
LYMPHOCYTES # BLD: 2.12 K/UL (ref 0.8–3.5)
LYMPHOCYTES NFR BLD: 34.9 % (ref 12–49)
MAGNESIUM SERPL-MCNC: 2.4 MG/DL (ref 1.6–2.4)
MCH RBC QN AUTO: 29.7 PG (ref 26–34)
MCHC RBC AUTO-ENTMCNC: 33.7 G/DL (ref 30–36.5)
MCV RBC AUTO: 88.3 FL (ref 80–99)
MONOCYTES # BLD: 0.47 K/UL (ref 0–1)
MONOCYTES NFR BLD: 7.7 % (ref 5–13)
NEUTS SEG # BLD: 3.28 K/UL (ref 1.8–8)
NEUTS SEG NFR BLD: 54.1 % (ref 32–75)
NRBC # BLD: 0 K/UL (ref 0–0.01)
NRBC BLD-RTO: 0 PER 100 WBC
PLATELET # BLD AUTO: 220 K/UL (ref 150–400)
PMV BLD AUTO: 11.5 FL (ref 8.9–12.9)
POTASSIUM SERPL-SCNC: 3.7 MMOL/L (ref 3.5–5.1)
PROT SERPL-MCNC: 7.8 G/DL (ref 6.4–8.2)
RBC # BLD AUTO: 5.55 M/UL (ref 4.1–5.7)
SODIUM SERPL-SCNC: 137 MMOL/L (ref 136–145)
TRIGL SERPL-MCNC: 293 MG/DL
VLDLC SERPL CALC-MCNC: 58.6 MG/DL
WBC # BLD AUTO: 6.1 K/UL (ref 4.1–11.1)

## 2025-02-03 LAB — TSH SERPL DL<=0.05 MIU/L-ACNC: 2.11 UIU/ML (ref 0.45–4.5)

## 2025-02-10 ENCOUNTER — OFFICE VISIT (OUTPATIENT)
Age: 35
End: 2025-02-10
Payer: COMMERCIAL

## 2025-02-10 VITALS
WEIGHT: 210 LBS | RESPIRATION RATE: 16 BRPM | SYSTOLIC BLOOD PRESSURE: 120 MMHG | DIASTOLIC BLOOD PRESSURE: 79 MMHG | OXYGEN SATURATION: 97 % | HEIGHT: 72 IN | HEART RATE: 77 BPM | TEMPERATURE: 97.9 F | BODY MASS INDEX: 28.44 KG/M2

## 2025-02-10 DIAGNOSIS — R22.2 SUBCUTANEOUS MASS OF BACK: Primary | ICD-10-CM

## 2025-02-10 PROCEDURE — 3078F DIAST BP <80 MM HG: CPT | Performed by: SURGERY

## 2025-02-10 PROCEDURE — 3074F SYST BP LT 130 MM HG: CPT | Performed by: SURGERY

## 2025-02-10 PROCEDURE — G8427 DOCREV CUR MEDS BY ELIG CLIN: HCPCS | Performed by: SURGERY

## 2025-02-10 PROCEDURE — 99203 OFFICE O/P NEW LOW 30 MIN: CPT | Performed by: SURGERY

## 2025-02-10 PROCEDURE — 1036F TOBACCO NON-USER: CPT | Performed by: SURGERY

## 2025-02-10 PROCEDURE — G8417 CALC BMI ABV UP PARAM F/U: HCPCS | Performed by: SURGERY

## 2025-02-10 ASSESSMENT — PATIENT HEALTH QUESTIONNAIRE - PHQ9
SUM OF ALL RESPONSES TO PHQ QUESTIONS 1-9: 0
SUM OF ALL RESPONSES TO PHQ9 QUESTIONS 1 & 2: 0
SUM OF ALL RESPONSES TO PHQ QUESTIONS 1-9: 0
2. FEELING DOWN, DEPRESSED OR HOPELESS: NOT AT ALL
1. LITTLE INTEREST OR PLEASURE IN DOING THINGS: NOT AT ALL

## 2025-02-10 NOTE — PROGRESS NOTES
Subjective:      Emre Meadows is a 35 y.o. male who presents for the evaluation of upper back subcutaneous mass.  He notes a 1 year history of a upper back subcutaneous mass overlying his right scapula, mobile, likely enlarging.  He denies pain, paresthesia, prior inflammation or drainage.  No family history of sarcoma or neurofibromatosis.    Past Medical History:   Diagnosis Date    Class 1 obesity due to excess calories with serious comorbidity and body mass index (BMI) of 30.0 to 30.9 in adult 01/02/2024    Hypertension     Kidney stone 01/2019    Ureteric stone 02/08/2019     Patient Active Problem List    Diagnosis Date Noted    Subcutaneous mass of back 02/10/2025    Mild obstructive sleep apnea 01/31/2025    Overweight (BMI 25.0-29.9) 07/22/2024    Primary hypertension 01/02/2024    Snoring 01/02/2024    Elevated cholesterol 08/18/2020     No past surgical history on file.  Family History   Problem Relation Age of Onset    Sleep Apnea Mother     Hypertension Father     High Blood Pressure Father     Cancer Paternal Grandfather         bladder     Social History     Socioeconomic History    Marital status:      Spouse name: None    Number of children: None    Years of education: None    Highest education level: None   Tobacco Use    Smoking status: Never    Smokeless tobacco: Never   Vaping Use    Vaping status: Never Used   Substance and Sexual Activity    Alcohol use: Yes     Alcohol/week: 4.0 standard drinks of alcohol    Drug use: Never    Sexual activity: Yes     Partners: Female     Social Determinants of Health     Physical Activity: Insufficiently Active (12/4/2023)    Exercise Vital Sign     Days of Exercise per Week: 1 day     Minutes of Exercise per Session: 60 min     Current Outpatient Medications on File Prior to Visit   Medication Sig Dispense Refill    nystatin (MYCOSTATIN) 844980 UNIT/GM cream Apply topically 2 times daily. 30 g 2    chlorthalidone (HYGROTON) 25 MG tablet Take 1 tablet

## 2025-02-10 NOTE — PROGRESS NOTES
Identified patient with two patient identifiers (name and ). Reviewed chart in preparation for visit and have obtained necessary documentation.    Emre Meadows is a 35 y.o. male  Chief Complaint   Patient presents with    New Patient    Cyst     Right upper back     /79 (Site: Left Upper Arm, Position: Sitting, Cuff Size: Large Adult)   Pulse 77   Temp 97.9 °F (36.6 °C)   Resp 16   Ht 1.829 m (6')   Wt 95.3 kg (210 lb)   SpO2 97%   BMI 28.48 kg/m²     1. Have you been to the ER, urgent care clinic since your last visit?  Hospitalized since your last visit?new patient    2. Have you seen or consulted any other health care providers outside of the [facility] since your last visit?  Include any pap smears or colon screening. New patient

## 2025-02-11 ENCOUNTER — TELEPHONE (OUTPATIENT)
Age: 35
End: 2025-02-11

## 2025-02-11 DIAGNOSIS — I10 PRIMARY HYPERTENSION: ICD-10-CM

## 2025-02-11 RX ORDER — CHLORTHALIDONE 25 MG/1
25 TABLET ORAL DAILY
Qty: 90 TABLET | Refills: 3 | Status: SHIPPED | OUTPATIENT
Start: 2025-02-11

## 2025-02-11 NOTE — TELEPHONE ENCOUNTER
Last appointment: 01/31/2025 MD Gomez   Next appointment: 07/31/2025 MD Gomez   Previous refill encounter(s):   02/01/2024 [id number] with 3 refills.     For Pharmacy Admin Tracking Only    Program: Medication Refill  Intervention Detail: New Rx: 1, reason: Patient Preference  Time Spent (min): 5    Requested Prescriptions     Pending Prescriptions Disp Refills    chlorthalidone (HYGROTON) 25 MG tablet 90 tablet 0     Sig: Take 1 tablet by mouth daily

## 2025-02-12 ENCOUNTER — PREP FOR PROCEDURE (OUTPATIENT)
Age: 35
End: 2025-02-12

## 2025-02-12 ENCOUNTER — TELEPHONE (OUTPATIENT)
Age: 35
End: 2025-02-12

## 2025-02-12 DIAGNOSIS — R22.2 MASS ON BACK: ICD-10-CM

## 2025-02-12 NOTE — TELEPHONE ENCOUNTER
Contacted patient regarding scheduling surgery with Dr Estevez. Offered the date of 02/21 and patient accepted. Informed patient that PAT will be reaching out as well as a surgical letter will be sent out, patient understood.

## 2025-02-14 ENCOUNTER — HOSPITAL ENCOUNTER (OUTPATIENT)
Facility: HOSPITAL | Age: 35
Discharge: HOME | End: 2025-02-17

## 2025-02-14 VITALS
SYSTOLIC BLOOD PRESSURE: 127 MMHG | HEART RATE: 62 BPM | WEIGHT: 205 LBS | TEMPERATURE: 98 F | DIASTOLIC BLOOD PRESSURE: 77 MMHG | HEIGHT: 72 IN | BODY MASS INDEX: 27.77 KG/M2

## 2025-02-14 RX ORDER — FLUCONAZOLE 200 MG/1
200 TABLET ORAL WEEKLY
COMMUNITY

## 2025-02-14 RX ORDER — KETOCONAZOLE 20 MG/G
CREAM TOPICAL DAILY
COMMUNITY

## 2025-02-14 NOTE — PERIOP NOTE
[address] [address]   MAIN OR                                  [phone]    MAIN PRE OP             [phone]                                                                                AMBULATORY PRE OP          [phone]  PRE-ADMISSION TESTING    [phone]     Surgery Date:  2/21/25       Is surgery arrival time given by surgeon? YES    If “NO”, [facility] staff will call you between 4 and 7pm the day before your surgery with your arrival time. (If your surgery is on a Monday, we will call you the Friday before.)    Call [phone] after 7pm Monday-Friday if you did not receive this call.    INSTRUCTIONS BEFORE YOUR SURGERY   When You  Arrive Arrive at [facility] Patient Access on 1st floor the day of your surgery.  Have your insurance card, photo ID,living will/advanced directive/POA (if applicable),  and any copayment (if needed)   Food   and   Drink NO solid food after midnight the night before surgery. You can drink clear liquids from midnight until ONE hour prior to your arrival at the hospital on the day of your surgery. Clear liquids include:  Water  Apple juice (no sediment)  Carbonated beverages  Black coffee(no cream/milk)  Tea(no cream/milk)  Gatorade    No alcohol (beer, wine, liquor) or marijuana (smoking) 24 hours, edibles (3 days). Stop smoking cigarettes 14 days before surgery (helps w/healing and breathing).   Medications to   TAKE   Morning of Surgery MEDICATIONS TO TAKE THE MORNING OF SURGERY WITH A SIP OF WATER: NONE    You may take these medications, IF NEEDED, the morning of surgery: TYLENOL     Ask your surgeon/prescribing doctor for instructions on taking or stopping these medications prior to surgery: ANY YOU HAVE QUESTIONS ON    Medications to STOP  before surgery Non-Steroidal anti-inflammatory Drugs (NSAID's): for example, Diclofenac (Voltaren), Ibuprofen (Advil, Motrin), Naproxen (Aleve)  PER

## 2025-02-20 ENCOUNTER — ANESTHESIA EVENT (OUTPATIENT)
Facility: HOSPITAL | Age: 35
End: 2025-02-20
Payer: COMMERCIAL

## 2025-02-21 ENCOUNTER — HOSPITAL ENCOUNTER (OUTPATIENT)
Facility: HOSPITAL | Age: 35
Setting detail: OUTPATIENT SURGERY
Discharge: HOME | End: 2025-02-21
Attending: SURGERY | Admitting: SURGERY
Payer: COMMERCIAL

## 2025-02-21 ENCOUNTER — ANESTHESIA (OUTPATIENT)
Facility: HOSPITAL | Age: 35
End: 2025-02-21
Payer: COMMERCIAL

## 2025-02-21 VITALS
HEART RATE: 70 BPM | TEMPERATURE: 97.3 F | DIASTOLIC BLOOD PRESSURE: 88 MMHG | OXYGEN SATURATION: 98 % | BODY MASS INDEX: 28.17 KG/M2 | RESPIRATION RATE: 12 BRPM | HEIGHT: 72 IN | WEIGHT: 208 LBS | SYSTOLIC BLOOD PRESSURE: 125 MMHG

## 2025-02-21 DIAGNOSIS — R22.2 MASS ON BACK: Primary | ICD-10-CM

## 2025-02-21 PROCEDURE — 3600000002 HC SURGERY LEVEL 2 BASE: Performed by: SURGERY

## 2025-02-21 PROCEDURE — 6360000002 HC RX W HCPCS

## 2025-02-21 PROCEDURE — 6360000002 HC RX W HCPCS: Performed by: ANESTHESIOLOGY

## 2025-02-21 PROCEDURE — 7100000001 HC PACU RECOVERY - ADDTL 15 MIN: Performed by: SURGERY

## 2025-02-21 PROCEDURE — 21931 EXC BACK LES SC 3 CM/>: CPT | Performed by: SURGERY

## 2025-02-21 PROCEDURE — 7100000010 HC PHASE II RECOVERY - FIRST 15 MIN: Performed by: SURGERY

## 2025-02-21 PROCEDURE — 2709999900 HC NON-CHARGEABLE SUPPLY: Performed by: SURGERY

## 2025-02-21 PROCEDURE — 7100000000 HC PACU RECOVERY - FIRST 15 MIN: Performed by: SURGERY

## 2025-02-21 PROCEDURE — 3700000001 HC ADD 15 MINUTES (ANESTHESIA): Performed by: SURGERY

## 2025-02-21 PROCEDURE — 6360000002 HC RX W HCPCS: Performed by: SURGERY

## 2025-02-21 PROCEDURE — 3600000012 HC SURGERY LEVEL 2 ADDTL 15MIN: Performed by: SURGERY

## 2025-02-21 PROCEDURE — 3700000000 HC ANESTHESIA ATTENDED CARE: Performed by: SURGERY

## 2025-02-21 PROCEDURE — 88305 TISSUE EXAM BY PATHOLOGIST: CPT

## 2025-02-21 PROCEDURE — 2580000003 HC RX 258: Performed by: ANESTHESIOLOGY

## 2025-02-21 RX ORDER — LIDOCAINE HYDROCHLORIDE 20 MG/ML
INJECTION, SOLUTION EPIDURAL; INFILTRATION; INTRACAUDAL; PERINEURAL
Status: DISCONTINUED | OUTPATIENT
Start: 2025-02-21 | End: 2025-02-21 | Stop reason: SDUPTHER

## 2025-02-21 RX ORDER — FENTANYL CITRATE 50 UG/ML
INJECTION, SOLUTION INTRAMUSCULAR; INTRAVENOUS
Status: DISCONTINUED | OUTPATIENT
Start: 2025-02-21 | End: 2025-02-21 | Stop reason: SDUPTHER

## 2025-02-21 RX ORDER — ONDANSETRON 2 MG/ML
4 INJECTION INTRAMUSCULAR; INTRAVENOUS
Status: COMPLETED | OUTPATIENT
Start: 2025-02-21 | End: 2025-02-21

## 2025-02-21 RX ORDER — FENTANYL CITRATE 50 UG/ML
100 INJECTION, SOLUTION INTRAMUSCULAR; INTRAVENOUS
Status: DISCONTINUED | OUTPATIENT
Start: 2025-02-21 | End: 2025-02-21 | Stop reason: HOSPADM

## 2025-02-21 RX ORDER — SODIUM CHLORIDE 9 MG/ML
INJECTION, SOLUTION INTRAVENOUS PRN
Status: DISCONTINUED | OUTPATIENT
Start: 2025-02-21 | End: 2025-02-21 | Stop reason: HOSPADM

## 2025-02-21 RX ORDER — DIPHENHYDRAMINE HYDROCHLORIDE 50 MG/ML
12.5 INJECTION INTRAMUSCULAR; INTRAVENOUS
Status: DISCONTINUED | OUTPATIENT
Start: 2025-02-21 | End: 2025-02-21 | Stop reason: HOSPADM

## 2025-02-21 RX ORDER — SODIUM CHLORIDE 0.9 % (FLUSH) 0.9 %
5-40 SYRINGE (ML) INJECTION PRN
Status: DISCONTINUED | OUTPATIENT
Start: 2025-02-21 | End: 2025-02-21 | Stop reason: HOSPADM

## 2025-02-21 RX ORDER — PROCHLORPERAZINE EDISYLATE 5 MG/ML
5 INJECTION INTRAMUSCULAR; INTRAVENOUS
Status: DISCONTINUED | OUTPATIENT
Start: 2025-02-21 | End: 2025-02-21 | Stop reason: HOSPADM

## 2025-02-21 RX ORDER — ONDANSETRON 2 MG/ML
4 INJECTION INTRAMUSCULAR; INTRAVENOUS ONCE
Status: COMPLETED | OUTPATIENT
Start: 2025-02-21 | End: 2025-02-21

## 2025-02-21 RX ORDER — MIDAZOLAM HYDROCHLORIDE 5 MG/5ML
5 INJECTION, SOLUTION INTRAMUSCULAR; INTRAVENOUS
Status: DISCONTINUED | OUTPATIENT
Start: 2025-02-21 | End: 2025-02-21 | Stop reason: HOSPADM

## 2025-02-21 RX ORDER — SODIUM CHLORIDE 9 MG/ML
INJECTION, SOLUTION INTRAVENOUS CONTINUOUS
Status: DISCONTINUED | OUTPATIENT
Start: 2025-02-21 | End: 2025-02-21 | Stop reason: HOSPADM

## 2025-02-21 RX ORDER — SODIUM CHLORIDE, SODIUM LACTATE, POTASSIUM CHLORIDE, CALCIUM CHLORIDE 600; 310; 30; 20 MG/100ML; MG/100ML; MG/100ML; MG/100ML
INJECTION, SOLUTION INTRAVENOUS CONTINUOUS
Status: DISCONTINUED | OUTPATIENT
Start: 2025-02-21 | End: 2025-02-21 | Stop reason: HOSPADM

## 2025-02-21 RX ORDER — BUPIVACAINE HYDROCHLORIDE 5 MG/ML
30 INJECTION, SOLUTION EPIDURAL; INTRACAUDAL ONCE
Status: COMPLETED | OUTPATIENT
Start: 2025-02-21 | End: 2025-02-21

## 2025-02-21 RX ORDER — MEPERIDINE HYDROCHLORIDE 25 MG/ML
12.5 INJECTION INTRAMUSCULAR; INTRAVENOUS; SUBCUTANEOUS EVERY 5 MIN PRN
Status: DISCONTINUED | OUTPATIENT
Start: 2025-02-21 | End: 2025-02-21 | Stop reason: HOSPADM

## 2025-02-21 RX ORDER — GLYCOPYRROLATE 0.2 MG/ML
INJECTION INTRAMUSCULAR; INTRAVENOUS
Status: DISCONTINUED | OUTPATIENT
Start: 2025-02-21 | End: 2025-02-21 | Stop reason: SDUPTHER

## 2025-02-21 RX ORDER — OXYCODONE HYDROCHLORIDE 5 MG/1
5 TABLET ORAL EVERY 6 HOURS PRN
Qty: 12 TABLET | Refills: 0 | Status: SHIPPED | OUTPATIENT
Start: 2025-02-21 | End: 2025-02-24

## 2025-02-21 RX ORDER — NALOXONE HYDROCHLORIDE 0.4 MG/ML
INJECTION, SOLUTION INTRAMUSCULAR; INTRAVENOUS; SUBCUTANEOUS PRN
Status: DISCONTINUED | OUTPATIENT
Start: 2025-02-21 | End: 2025-02-21 | Stop reason: HOSPADM

## 2025-02-21 RX ORDER — MIDAZOLAM HYDROCHLORIDE 1 MG/ML
INJECTION, SOLUTION INTRAMUSCULAR; INTRAVENOUS
Status: DISCONTINUED | OUTPATIENT
Start: 2025-02-21 | End: 2025-02-21 | Stop reason: SDUPTHER

## 2025-02-21 RX ORDER — PROPOFOL 10 MG/ML
INJECTION, EMULSION INTRAVENOUS
Status: DISCONTINUED | OUTPATIENT
Start: 2025-02-21 | End: 2025-02-21 | Stop reason: SDUPTHER

## 2025-02-21 RX ORDER — SODIUM CHLORIDE 0.9 % (FLUSH) 0.9 %
5-40 SYRINGE (ML) INJECTION EVERY 12 HOURS SCHEDULED
Status: DISCONTINUED | OUTPATIENT
Start: 2025-02-21 | End: 2025-02-21 | Stop reason: HOSPADM

## 2025-02-21 RX ORDER — FENTANYL CITRATE 50 UG/ML
50 INJECTION, SOLUTION INTRAMUSCULAR; INTRAVENOUS EVERY 5 MIN PRN
Status: DISCONTINUED | OUTPATIENT
Start: 2025-02-21 | End: 2025-02-21 | Stop reason: HOSPADM

## 2025-02-21 RX ORDER — MIDAZOLAM HYDROCHLORIDE 2 MG/2ML
2 INJECTION, SOLUTION INTRAMUSCULAR; INTRAVENOUS
Status: DISCONTINUED | OUTPATIENT
Start: 2025-02-21 | End: 2025-02-21 | Stop reason: HOSPADM

## 2025-02-21 RX ORDER — HYDRALAZINE HYDROCHLORIDE 20 MG/ML
10 INJECTION INTRAMUSCULAR; INTRAVENOUS
Status: DISCONTINUED | OUTPATIENT
Start: 2025-02-21 | End: 2025-02-21 | Stop reason: HOSPADM

## 2025-02-21 RX ADMIN — LIDOCAINE HYDROCHLORIDE 40 MG: 20 INJECTION, SOLUTION EPIDURAL; INFILTRATION; INTRACAUDAL; PERINEURAL at 11:00

## 2025-02-21 RX ADMIN — MIDAZOLAM HYDROCHLORIDE 5 MG: 1 INJECTION, SOLUTION INTRAMUSCULAR; INTRAVENOUS at 11:10

## 2025-02-21 RX ADMIN — SODIUM CHLORIDE, POTASSIUM CHLORIDE, SODIUM LACTATE AND CALCIUM CHLORIDE: 600; 310; 30; 20 INJECTION, SOLUTION INTRAVENOUS at 10:18

## 2025-02-21 RX ADMIN — PROPOFOL 150 MCG/KG/MIN: 10 INJECTION, EMULSION INTRAVENOUS at 11:11

## 2025-02-21 RX ADMIN — PROPOFOL 200 MG: 10 INJECTION, EMULSION INTRAVENOUS at 11:14

## 2025-02-21 RX ADMIN — PROPOFOL 100 MG: 10 INJECTION, EMULSION INTRAVENOUS at 10:58

## 2025-02-21 RX ADMIN — LIDOCAINE HYDROCHLORIDE 50 MG: 20 INJECTION, SOLUTION EPIDURAL; INFILTRATION; INTRACAUDAL; PERINEURAL at 11:10

## 2025-02-21 RX ADMIN — FENTANYL CITRATE 25 MCG: 50 INJECTION INTRAMUSCULAR; INTRAVENOUS at 11:15

## 2025-02-21 RX ADMIN — PROPOFOL 100 MG: 10 INJECTION, EMULSION INTRAVENOUS at 11:10

## 2025-02-21 RX ADMIN — ONDANSETRON 4 MG: 2 INJECTION INTRAMUSCULAR; INTRAVENOUS at 11:29

## 2025-02-21 RX ADMIN — ONDANSETRON 4 MG: 2 INJECTION INTRAMUSCULAR; INTRAVENOUS at 13:01

## 2025-02-21 RX ADMIN — FENTANYL CITRATE 25 MCG: 50 INJECTION INTRAMUSCULAR; INTRAVENOUS at 11:46

## 2025-02-21 RX ADMIN — MIDAZOLAM HYDROCHLORIDE 3 MG: 1 INJECTION, SOLUTION INTRAMUSCULAR; INTRAVENOUS at 10:51

## 2025-02-21 RX ADMIN — FENTANYL CITRATE 50 MCG: 50 INJECTION INTRAMUSCULAR; INTRAVENOUS at 11:21

## 2025-02-21 RX ADMIN — GLYCOPYRROLATE 0.2 MG: 0.2 INJECTION INTRAMUSCULAR; INTRAVENOUS at 11:21

## 2025-02-21 RX ADMIN — MIDAZOLAM HYDROCHLORIDE 2 MG: 1 INJECTION, SOLUTION INTRAMUSCULAR; INTRAVENOUS at 10:58

## 2025-02-21 ASSESSMENT — PAIN - FUNCTIONAL ASSESSMENT: PAIN_FUNCTIONAL_ASSESSMENT: NONE - DENIES PAIN

## 2025-02-21 NOTE — ANESTHESIA POSTPROCEDURE EVALUATION
Department of Anesthesiology  Postprocedure Note    Patient: Emre Meadows  MRN: [id number]  YOB: 1990  Date of evaluation: 2/21/2025    Procedure Summary       Date: 02/21/25 Room / Location: [facility] MAIN OR [address] [place] [address] MAIN OR    Anesthesia Start: 1051 Anesthesia Stop: 1156    Procedure: EXCISIONAL BIOPSY OF UPPER BACK SUBCUTANEOUS MASS (Right: Back) Diagnosis:       Mass on back      (Mass on back [R22.2])    Providers: Robbie Villalta MD Responsible Provider:     Anesthesia Type: MAC ASA Status: 2            Anesthesia Type: MAC    Vilma Phase I:      Vilma Phase II:      Anesthesia Post Evaluation    Patient location during evaluation: PACU  Patient participation: complete - patient participated  Level of consciousness: awake  Airway patency: patent  Nausea & Vomiting: no nausea  Cardiovascular status: blood pressure returned to baseline and hemodynamically stable  Respiratory status: acceptable  Hydration status: stable  Multimodal analgesia pain management approach    No notable events documented.

## 2025-02-21 NOTE — ANESTHESIA PRE PROCEDURE
RDW 12.8 01/31/2025 09:18 AM     01/31/2025 09:18 AM       CMP:   Lab Results   Component Value Date/Time     01/31/2025 09:18 AM    K 3.7 01/31/2025 09:18 AM    CL 99 01/31/2025 09:18 AM    CO2 31 01/31/2025 09:18 AM    BUN 15 01/31/2025 09:18 AM    CREATININE 1.08 01/31/2025 09:18 AM    GFRAA >60 04/22/2022 07:40 AM    AGRATIO 1.4 04/22/2022 07:40 AM    LABGLOM >90 01/31/2025 09:18 AM    LABGLOM >60 12/22/2023 09:38 AM    GLUCOSE 93 01/31/2025 09:18 AM    CALCIUM 9.8 01/31/2025 09:18 AM    BILITOT 0.5 01/31/2025 09:18 AM    ALKPHOS 64 01/31/2025 09:18 AM    ALKPHOS 68 04/22/2022 07:40 AM    AST 19 01/31/2025 09:18 AM    ALT 37 01/31/2025 09:18 AM       POC Tests: No results for input(s): \"POCGLU\", \"POCNA\", \"POCK\", \"POCCL\", \"POCBUN\", \"POCHEMO\", \"POCHCT\" in the last 72 hours.    Coags: No results found for: \"PROTIME\", \"INR\", \"APTT\"    HCG (If Applicable): No results found for: \"PREGTESTUR\", \"PREGSERUM\", \"HCG\", \"HCGQUANT\"     ABGs: No results found for: \"PHART\", \"PO2ART\", \"HYZ5ISH\", \"MSS6LUM\", \"BEART\", \"H9AHCIZW\"     Type & Screen (If Applicable):  No results found for: \"ABORH\", \"LABANTI\"    Drug/Infectious Status (If Applicable):  Lab Results   Component Value Date/Time    HEPCAB Non Reactive 12/22/2023 09:38 AM       COVID-19 Screening (If Applicable): No results found for: \"COVID19\"        Anesthesia Evaluation  Patient summary reviewed and Nursing notes reviewed   no history of anesthetic complications:   Airway: Mallampati: III  TM distance: >3 FB   Neck ROM: full  Mouth opening: > = 3 FB   Dental: normal exam         Pulmonary: breath sounds clear to auscultation  (+)     sleep apnea:                                  Cardiovascular:  Exercise tolerance: good (>4 METS)  (+) hypertension:, hyperlipidemia        Rhythm: regular  Rate: normal                    Neuro/Psych:   Negative Neuro/Psych ROS              GI/Hepatic/Renal:            ROS comment: Subcutaneous mass .   Endo/Other: Negative

## 2025-02-21 NOTE — BRIEF OP NOTE
Brief Postoperative Note      Patient: Emre Meadows  YOB: 1990  MRN: [id number]    Date of Procedure: 2/21/2025    Pre-Op Diagnosis Codes:      * Mass on back [R22.2]    Post-Op Diagnosis: Same       Procedure(s):  EXCISIONAL BIOPSY OF UPPER BACK SUBCUTANEOUS MASS    Surgeon(s):  Robbie Villalta MD    Assistant:  Surgical Assistant: Henri Mendoza    Anesthesia: Monitor Anesthesia Care    Estimated Blood Loss (mL): Minimal    Complications: None    Specimens:   ID Type Source Tests Collected by Time Destination   1 : UPPER BACK SUBCUTANEOUS MASS Tissue Back SURGICAL PATHOLOGY Robbie Villalta MD 2/21/2025 1130        Implants:  * No implants in log *      Drains: * No LDAs found *    Findings:  Infection Present At Time Of Surgery (PATOS) (choose all levels that have infection present):  No infection present  Other Findings: A 4 cm x 3 cm right upper back fatty lesion consistent with mature lipoma, completely excised.    This procedure was not performed to treat primary cutaneous melanoma through wide local excision    Electronically signed by Robbie Villalta MD on 2/21/2025 at 11:41 AM

## 2025-02-21 NOTE — H&P
Subjective:      Emre Meadows is a 35 y.o. male who presents for the evaluation of upper back subcutaneous mass.  He notes a 1 year history of a upper back subcutaneous mass overlying his right scapula, mobile, likely enlarging.  He denies pain, paresthesia, prior inflammation or drainage.  No family history of sarcoma or neurofibromatosis.     Past Medical History        Past Medical History:   Diagnosis Date    Class 1 obesity due to excess calories with serious comorbidity and body mass index (BMI) of 30.0 to 30.9 in adult 01/02/2024    Hypertension      Kidney stone 01/2019    Ureteric stone 02/08/2019              Patient Active Problem List     Diagnosis Date Noted    Subcutaneous mass of back 02/10/2025    Mild obstructive sleep apnea 01/31/2025    Overweight (BMI 25.0-29.9) 07/22/2024    Primary hypertension 01/02/2024    Snoring 01/02/2024    Elevated cholesterol 08/18/2020      Past Surgical History   No past surgical history on file.     Family History         Family History   Problem Relation Age of Onset    Sleep Apnea Mother      Hypertension Father      High Blood Pressure Father      Cancer Paternal Grandfather           bladder         Social History   Social History            Socioeconomic History    Marital status:        Spouse name: None    Number of children: None    Years of education: None    Highest education level: None   Tobacco Use    Smoking status: Never    Smokeless tobacco: Never   Vaping Use    Vaping status: Never Used   Substance and Sexual Activity    Alcohol use: Yes       Alcohol/week: 4.0 standard drinks of alcohol    Drug use: Never    Sexual activity: Yes       Partners: Female      Social Determinants of Health           Physical Activity: Insufficiently Active (12/4/2023)     Exercise Vital Sign      Days of Exercise per Week: 1 day      Minutes of Exercise per Session: 60 min         Medications Ordered Prior to Encounter          Current Outpatient Medications on

## 2025-02-21 NOTE — OP NOTE
[address]  [address]                            OPERATIVE REPORT      PATIENT NAME: KENNETH CABRERA                 : 1990  MED REC NO: [id number]                       ROOM: OR  ACCOUNT NO: [id number]                       ADMIT DATE: 2025  PROVIDER: Robbie Villalta MD    DATE OF SERVICE:  2025    PREOPERATIVE DIAGNOSES:  Right upper back subcutaneous mass.    POSTOPERATIVE DIAGNOSES:  Right upper back subcutaneous mass.    PROCEDURES PERFORMED:  Excisional biopsy, right upper back subcutaneous mass (CPT 90325).    SURGEON:  Robbie Villalta MD    ASSISTANT:  Henri Mendoza SA.    ANESTHESIA:  Local with IV sedation.    DRAINS:  None.    SPONGE COUNT:  Correct.    NEEDLE COUNT:  Correct.    ESTIMATED BLOOD LOSS:  20 mL.    SPECIMENS REMOVED:  Upper back subcutaneous mass.    COMPLICATIONS:  None.    IMPLANTS:  None.    INDICATIONS:  The patient is a 35-year-old white male with a 1 year history of an enlarging upper back subcutaneous mass overlying his right scapula.  The lesion is slightly tender to palpation.  He is taken to the operating room today for excisional biopsy.    INTRAOPERATIVE FINDINGS:  A 4 cm x 3 cm right upper back fatty lesion consistent with mature lipoma, completely excised.    DESCRIPTION OF PROCEDURE:  The patient was identified as the correct patient in the preoperative holding area and informed consent was confirmed.  After answering the patient's remaining questions and performing site verification, he was taken to the operating room and placed on the operating room table in the supine position.  Sequential compression devices were placed on both lower extremities.  Following the uneventful initiation of intravenous sedation, he was transitioned to the decubitus position, left side down.  He was secured in this position with the safety straps, and all potential pressure points were padded with

## 2025-03-05 ENCOUNTER — OFFICE VISIT (OUTPATIENT)
Age: 35
End: 2025-03-05

## 2025-03-05 VITALS
HEIGHT: 72 IN | OXYGEN SATURATION: 98 % | SYSTOLIC BLOOD PRESSURE: 112 MMHG | HEART RATE: 68 BPM | RESPIRATION RATE: 16 BRPM | WEIGHT: 206.8 LBS | TEMPERATURE: 97.6 F | DIASTOLIC BLOOD PRESSURE: 78 MMHG | BODY MASS INDEX: 28.01 KG/M2

## 2025-03-05 DIAGNOSIS — Z51.89 VISIT FOR WOUND CHECK: Primary | ICD-10-CM

## 2025-03-05 PROCEDURE — 99024 POSTOP FOLLOW-UP VISIT: CPT

## 2025-03-05 ASSESSMENT — PATIENT HEALTH QUESTIONNAIRE - PHQ9
2. FEELING DOWN, DEPRESSED OR HOPELESS: NOT AT ALL
SUM OF ALL RESPONSES TO PHQ QUESTIONS 1-9: 0
1. LITTLE INTEREST OR PLEASURE IN DOING THINGS: NOT AT ALL
SUM OF ALL RESPONSES TO PHQ QUESTIONS 1-9: 0

## 2025-03-05 NOTE — PROGRESS NOTES
CC: Post Operative state    Subjective:     Emre Meadows is a 35 y.o. male presents for postop care 35 year old male 12 days s/p excisional biopsy, right upper back subcutaneous mass.     Pt reports he believes his surgical incision is healing well postop.  Denies any pain, swelling or issues with surgical site. Denies any drainage. Pt without complaints.  Denies fever/chills.     Review of Systems:  A comprehensive review of systems was negative except for that written above      Mr. Meadows has a reminder for a \"due or due soon\" health maintenance. I have asked that he contact his primary care provider for follow-up on this health maintenance.      Objective:     /78 (Site: Left Upper Arm, Position: Sitting, Cuff Size: Large Adult)   Pulse 68   Temp 97.6 °F (36.4 °C) (Oral)   Resp 16   Ht 1.829 m (6')   Wt 93.8 kg (206 lb 12.8 oz)   SpO2 98%   BMI 28.05 kg/m²     General: alert, appears stated age, cooperative, and no distress  CV: Regular rate and rhythm  Pulmonary: Lungs clear to auscultation; unlabored    Upper back Incision:  healing well, no drainage, no surrounding erythema, no swelling, no dehiscence, incision well approximated. No signs of infection.     Assessment:      Diagnosis Orders   1. Visit for wound check            35 year old male 12 days s/p excisional biopsy, right upper back subcutaneous mass doing well postop. Path showed tissue mass to be consistent w/lipoma.    Plan:     Reviewed pathology with patient   Soft tissue, right upper back, excision:        Mature adipose tissue consistent with lipoma.        Benign fibroconnective tissue and muscle.     Wound care discussed. May submerge in water and continue to wash with mild soap and water. May moisturize surgical sites as desired.   Activity as tolerated.  Avoid over reaching or heavy lifting with RUE over the next 2 weeks.  Emre Meadows verbalized understanding and questions were answered to the best of my knowledge and

## 2025-03-05 NOTE — PROGRESS NOTES
Identified patient with two patient identifiers (name and ). Reviewed chart in preparation for visit and have obtained necessary documentation.    Emre Meadows is a 35 y.o. male  Chief Complaint   Patient presents with    Post-Op Check     2 weeks s/p EXCISIONAL BIOPSY OF UPPER BACK SUBCUTANEOUS MASS DOS 25     /78 (Site: Left Upper Arm, Position: Sitting, Cuff Size: Large Adult)   Pulse 68   Temp 97.6 °F (36.4 °C) (Oral)   Resp 16   Ht 1.829 m (6')   Wt 93.8 kg (206 lb 12.8 oz)   SpO2 98%   BMI 28.05 kg/m²     1. Have you been to the ER, urgent care clinic since your last visit?  Hospitalized since your last visit?no    2. Have you seen or consulted any other health care providers outside of the [facility] since your last visit?  Include any pap smears or colon screening. no

## 2025-04-06 DIAGNOSIS — I10 PRIMARY HYPERTENSION: ICD-10-CM

## 2025-04-07 RX ORDER — CHLORTHALIDONE 25 MG/1
25 TABLET ORAL NIGHTLY
OUTPATIENT
Start: 2025-04-07

## 2025-04-07 NOTE — TELEPHONE ENCOUNTER
Pt requested refill(s) via "[employer]"   Writer sent pt a message via "[employer]"    Duplicate request:   02/11/2025 [place] 25 mg #90 with 3 refills was sent to [facility].     For Pharmacy Admin Tracking Only    Program: Medication Refill  Intervention Detail: Discontinued Rx: 1, reason: Duplicate Therapy  Time Spent (min): 10    Requested Prescriptions     Pending Prescriptions Disp Refills    chlorthalidone (HYGROTON) 25 MG tablet       Sig: Take 1 tablet by mouth at bedtime

## 2025-04-23 DIAGNOSIS — I10 PRIMARY HYPERTENSION: ICD-10-CM

## 2025-04-23 RX ORDER — CHLORTHALIDONE 25 MG/1
25 TABLET ORAL NIGHTLY
Qty: 90 TABLET | Refills: 2 | Status: SHIPPED | OUTPATIENT
Start: 2025-04-23

## 2025-08-06 ENCOUNTER — OFFICE VISIT (OUTPATIENT)
Age: 35
End: 2025-08-06
Payer: COMMERCIAL

## 2025-08-06 VITALS
WEIGHT: 213.8 LBS | TEMPERATURE: 98.9 F | BODY MASS INDEX: 29 KG/M2 | DIASTOLIC BLOOD PRESSURE: 83 MMHG | OXYGEN SATURATION: 97 % | HEART RATE: 63 BPM | SYSTOLIC BLOOD PRESSURE: 120 MMHG

## 2025-08-06 DIAGNOSIS — E78.00 ELEVATED CHOLESTEROL: ICD-10-CM

## 2025-08-06 DIAGNOSIS — I10 PRIMARY HYPERTENSION: Primary | ICD-10-CM

## 2025-08-06 DIAGNOSIS — E66.3 OVERWEIGHT (BMI 25.0-29.9): ICD-10-CM

## 2025-08-06 DIAGNOSIS — I10 PRIMARY HYPERTENSION: ICD-10-CM

## 2025-08-06 DIAGNOSIS — G47.33 MILD OBSTRUCTIVE SLEEP APNEA: ICD-10-CM

## 2025-08-06 PROBLEM — R06.83 SNORING: Status: RESOLVED | Noted: 2024-01-02 | Resolved: 2025-08-06

## 2025-08-06 PROBLEM — R22.2 MASS ON BACK: Status: RESOLVED | Noted: 2025-02-12 | Resolved: 2025-08-06

## 2025-08-06 PROBLEM — R22.2 SUBCUTANEOUS MASS OF BACK: Status: RESOLVED | Noted: 2025-02-10 | Resolved: 2025-08-06

## 2025-08-06 PROCEDURE — G8427 DOCREV CUR MEDS BY ELIG CLIN: HCPCS | Performed by: STUDENT IN AN ORGANIZED HEALTH CARE EDUCATION/TRAINING PROGRAM

## 2025-08-06 PROCEDURE — 3074F SYST BP LT 130 MM HG: CPT | Performed by: STUDENT IN AN ORGANIZED HEALTH CARE EDUCATION/TRAINING PROGRAM

## 2025-08-06 PROCEDURE — 1036F TOBACCO NON-USER: CPT | Performed by: STUDENT IN AN ORGANIZED HEALTH CARE EDUCATION/TRAINING PROGRAM

## 2025-08-06 PROCEDURE — G8417 CALC BMI ABV UP PARAM F/U: HCPCS | Performed by: STUDENT IN AN ORGANIZED HEALTH CARE EDUCATION/TRAINING PROGRAM

## 2025-08-06 PROCEDURE — 3079F DIAST BP 80-89 MM HG: CPT | Performed by: STUDENT IN AN ORGANIZED HEALTH CARE EDUCATION/TRAINING PROGRAM

## 2025-08-06 PROCEDURE — 99214 OFFICE O/P EST MOD 30 MIN: CPT | Performed by: STUDENT IN AN ORGANIZED HEALTH CARE EDUCATION/TRAINING PROGRAM

## 2025-08-06 SDOH — ECONOMIC STABILITY: FOOD INSECURITY: WITHIN THE PAST 12 MONTHS, THE FOOD YOU BOUGHT JUST DIDN'T LAST AND YOU DIDN'T HAVE MONEY TO GET MORE.: NEVER TRUE

## 2025-08-06 SDOH — ECONOMIC STABILITY: INCOME INSECURITY: IN THE LAST 12 MONTHS, WAS THERE A TIME WHEN YOU WERE NOT ABLE TO PAY THE MORTGAGE OR RENT ON TIME?: NO

## 2025-08-06 SDOH — ECONOMIC STABILITY: FOOD INSECURITY: WITHIN THE PAST 12 MONTHS, YOU WORRIED THAT YOUR FOOD WOULD RUN OUT BEFORE YOU GOT MONEY TO BUY MORE.: NEVER TRUE

## 2025-08-06 SDOH — ECONOMIC STABILITY: TRANSPORTATION INSECURITY
IN THE PAST 12 MONTHS, HAS THE LACK OF TRANSPORTATION KEPT YOU FROM MEDICAL APPOINTMENTS OR FROM GETTING MEDICATIONS?: NO

## 2025-08-06 ASSESSMENT — PATIENT HEALTH QUESTIONNAIRE - PHQ9
1. LITTLE INTEREST OR PLEASURE IN DOING THINGS: NOT AT ALL
SUM OF ALL RESPONSES TO PHQ QUESTIONS 1-9: 0
2. FEELING DOWN, DEPRESSED OR HOPELESS: NOT AT ALL
SUM OF ALL RESPONSES TO PHQ QUESTIONS 1-9: 0

## 2025-08-06 ASSESSMENT — ENCOUNTER SYMPTOMS
BLOOD IN STOOL: 0
COUGH: 0
DIARRHEA: 0
CONSTIPATION: 0
ABDOMINAL PAIN: 0
NAUSEA: 0
VOMITING: 0
SHORTNESS OF BREATH: 0

## 2025-08-07 LAB
ALBUMIN SERPL-MCNC: 4.8 G/DL (ref 3.5–5.2)
ALBUMIN/GLOB SERPL: 1.5 (ref 1.1–2.2)
ALP SERPL-CCNC: 73 U/L (ref 40–129)
ALT SERPL-CCNC: 48 U/L (ref 10–50)
ANION GAP SERPL CALC-SCNC: 12 MMOL/L (ref 2–14)
AST SERPL-CCNC: 31 U/L (ref 10–50)
BILIRUB SERPL-MCNC: 0.6 MG/DL (ref 0–1.2)
BUN SERPL-MCNC: 14 MG/DL (ref 6–20)
BUN/CREAT SERPL: 14 (ref 12–20)
CALCIUM SERPL-MCNC: 9.9 MG/DL (ref 8.6–10)
CHLORIDE SERPL-SCNC: 99 MMOL/L (ref 98–107)
CHOLEST SERPL-MCNC: 267 MG/DL (ref 0–200)
CO2 SERPL-SCNC: 27 MMOL/L (ref 20–29)
CREAT SERPL-MCNC: 0.97 MG/DL (ref 0.7–1.2)
GLOBULIN SER CALC-MCNC: 3.1 G/DL (ref 2–4)
GLUCOSE SERPL-MCNC: 92 MG/DL (ref 65–100)
HDLC SERPL-MCNC: 43 MG/DL (ref 40–60)
HDLC SERPL: 6.2
LDLC SERPL CALC-MCNC: 169 MG/DL
MAGNESIUM SERPL-MCNC: 2.5 MG/DL (ref 1.6–2.6)
POTASSIUM SERPL-SCNC: 4 MMOL/L (ref 3.5–5.1)
PROT SERPL-MCNC: 7.9 G/DL (ref 6.4–8.3)
SODIUM SERPL-SCNC: 138 MMOL/L (ref 136–145)
TRIGL SERPL-MCNC: 275 MG/DL (ref 0–150)
VLDLC SERPL CALC-MCNC: 55 MG/DL

## (undated) DEVICE — BASIN ST MAJOR-NO CAUTERY: Brand: MEDLINE INDUSTRIES, INC.

## (undated) DEVICE — X-RAY DETECTABLE SPONGES,16 PLY: Brand: VISTEC

## (undated) DEVICE — ELECTRODE PT RET AD L9FT HI MOIST COND ADH HYDRGEL CORDED

## (undated) DEVICE — APPLICATOR MEDICATED 26 CC SOLUTION HI LT ORNG CHLORAPREP

## (undated) DEVICE — SUTURE VICRYL + SZ 3-0 L27IN ABSRB UD L26MM SH 1/2 CIR VCP416H

## (undated) DEVICE — PACK,LAPAROTOMY,2 REINFORCED GOWNS: Brand: MEDLINE

## (undated) DEVICE — SUTURE MONOCRYL + SZ 4-0 L27IN ABSRB UD L19MM PS-2 3/8 CIR MCP426H

## (undated) DEVICE — GLOVE SURG SZ 75 L1212IN FNGR THK138MIL BRN LTX FREE

## (undated) DEVICE — SOLUTION IRRIG 1000ML 09% SOD CHL USP PIC PLAS CONTAINER

## (undated) DEVICE — GARMENT,MEDLINE,DVT,INT,CALF,MED, GEN2: Brand: MEDLINE

## (undated) DEVICE — HYPODERMIC SAFETY NEEDLE: Brand: MAGELLAN

## (undated) DEVICE — PENCIL SMK EVAC 10 FT BLADE ELECTRD ROCKER FOR TELSCP

## (undated) DEVICE — LIQUIBAND RAPID ADHESIVE 36/CS 0.8ML: Brand: MEDLINE

## (undated) DEVICE — TOWEL,OR,DSP,ST,BLUE,STD,4/PK,20PK/CS: Brand: MEDLINE